# Patient Record
Sex: FEMALE | Race: WHITE | NOT HISPANIC OR LATINO | Employment: FULL TIME | URBAN - NONMETROPOLITAN AREA
[De-identification: names, ages, dates, MRNs, and addresses within clinical notes are randomized per-mention and may not be internally consistent; named-entity substitution may affect disease eponyms.]

---

## 2023-03-27 DIAGNOSIS — K21.9 GERD WITHOUT ESOPHAGITIS: ICD-10-CM

## 2023-03-27 DIAGNOSIS — E55.9 VITAMIN D DEFICIENCY: ICD-10-CM

## 2023-03-27 DIAGNOSIS — F41.9 ANXIETY: Primary | ICD-10-CM

## 2023-03-27 RX ORDER — BUPROPION HYDROCHLORIDE 300 MG/1
1 TABLET ORAL DAILY
COMMUNITY
Start: 2022-05-11 | End: 2023-03-27 | Stop reason: SDUPTHER

## 2023-03-27 RX ORDER — TOPIRAMATE 50 MG/1
50 TABLET, FILM COATED ORAL NIGHTLY
COMMUNITY
Start: 2023-03-17 | End: 2024-03-05 | Stop reason: ALTCHOICE

## 2023-03-27 RX ORDER — ASPIRIN 325 MG
50000 TABLET, DELAYED RELEASE (ENTERIC COATED) ORAL
COMMUNITY
Start: 2022-05-19 | End: 2023-03-27 | Stop reason: SDUPTHER

## 2023-03-27 RX ORDER — OMEPRAZOLE 20 MG/1
1 TABLET, DELAYED RELEASE ORAL DAILY
COMMUNITY
Start: 2023-02-09 | End: 2023-03-27 | Stop reason: SDUPTHER

## 2023-03-27 RX ORDER — ACETAMINOPHEN 500 MG
TABLET ORAL
COMMUNITY
Start: 2021-12-08

## 2023-03-27 RX ORDER — CETIRIZINE HYDROCHLORIDE 10 MG/1
1 TABLET ORAL DAILY
COMMUNITY
Start: 2023-02-09 | End: 2023-06-13 | Stop reason: SDUPTHER

## 2023-03-27 RX ORDER — SIMVASTATIN 40 MG/1
40 TABLET, FILM COATED ORAL NIGHTLY
COMMUNITY
Start: 2023-02-09 | End: 2023-05-22 | Stop reason: SDUPTHER

## 2023-03-28 RX ORDER — BUPROPION HYDROCHLORIDE 300 MG/1
300 TABLET ORAL DAILY
Qty: 30 TABLET | Refills: 0 | Status: SHIPPED | OUTPATIENT
Start: 2023-03-28 | End: 2023-05-02 | Stop reason: SDUPTHER

## 2023-03-28 RX ORDER — ASPIRIN 325 MG
50000 TABLET, DELAYED RELEASE (ENTERIC COATED) ORAL
Qty: 4 CAPSULE | Refills: 0 | Status: SHIPPED | OUTPATIENT
Start: 2023-03-28 | End: 2023-04-03 | Stop reason: SDUPTHER

## 2023-03-28 RX ORDER — OMEPRAZOLE 20 MG/1
20 TABLET, DELAYED RELEASE ORAL DAILY
Qty: 30 TABLET | Refills: 0 | Status: SHIPPED | OUTPATIENT
Start: 2023-03-28 | End: 2023-05-02 | Stop reason: SDUPTHER

## 2023-04-03 ENCOUNTER — TELEMEDICINE (OUTPATIENT)
Dept: PRIMARY CARE | Facility: CLINIC | Age: 63
End: 2023-04-03
Payer: MEDICAID

## 2023-04-03 DIAGNOSIS — I25.10 CORONARY ARTERY DISEASE INVOLVING NATIVE CORONARY ARTERY OF NATIVE HEART WITHOUT ANGINA PECTORIS: ICD-10-CM

## 2023-04-03 DIAGNOSIS — E55.9 VITAMIN D DEFICIENCY: ICD-10-CM

## 2023-04-03 DIAGNOSIS — E66.09 OBESITY DUE TO EXCESS CALORIES WITH SERIOUS COMORBIDITY, UNSPECIFIED CLASSIFICATION: ICD-10-CM

## 2023-04-03 DIAGNOSIS — F32.A DEPRESSION, UNSPECIFIED DEPRESSION TYPE: ICD-10-CM

## 2023-04-03 DIAGNOSIS — U07.1 COVID-19: Primary | ICD-10-CM

## 2023-04-03 PROCEDURE — 99214 OFFICE O/P EST MOD 30 MIN: CPT | Performed by: INTERNAL MEDICINE

## 2023-04-03 RX ORDER — AZITHROMYCIN 250 MG/1
TABLET, FILM COATED ORAL
Qty: 6 TABLET | Refills: 0 | Status: SHIPPED | OUTPATIENT
Start: 2023-04-03 | End: 2023-05-02 | Stop reason: ALTCHOICE

## 2023-04-03 NOTE — PROGRESS NOTES
Subjective   Patient ID: June Borja is a 62 y.o. female who presents for COVID 19  HPI    Tele health visit due to COVID 19    Patient presented today as an acute visit.  She complained of sinus pressure with sore throat and chills with some shortness of breath yesterday x2 days.  She also stated she had a dry cough.  Today she did a COVID test and found she was COVID-positive.      CAD stable on therapy no side effects    COPD stable on therapy no side effects    Depression stable on therapy no side effects    Diet and exercise reviewed    Review of Systems   All other systems reviewed and are negative.      Objective   Physical Exam  Constitutional:       Appearance: Normal appearance. She is obese.      Comments: NAD   Pulmonary:      Effort: Pulmonary effort is normal.   Neurological:      Mental Status: She is alert.   Psychiatric:         Mood and Affect: Mood normal.         Assessment/Plan   Problem List Items Addressed This Visit    None  Visit Diagnoses       COVID-19    -  Primary    Coronary artery disease involving native coronary artery of native heart without angina pectoris        COPD not affecting current episode of care (CMS/[facility])        Depression, unspecified depression type        Obesity due to excess calories with serious comorbidity, unspecified classification            COVID-19  Patient will stay home in isolation  for at least 5 days   Z-Uqincy as directed  started   risk benefits reviewed   rest increase hydration   follow-up as needed /  to ER if symptoms worsen    CAD stable on therapy no side effects    COPD stable on therapy no side effects    Depression stable on therapy no side effects    Diet and exercise reviewed     follow-up 2 weeks.

## 2023-04-04 RX ORDER — ASPIRIN 325 MG
50000 TABLET, DELAYED RELEASE (ENTERIC COATED) ORAL
Qty: 4 CAPSULE | Refills: 0 | Status: SHIPPED | OUTPATIENT
Start: 2023-04-04 | End: 2023-05-04

## 2023-04-28 PROBLEM — I21.9 MYOCARDIAL INFARCTION (MULTI): Status: ACTIVE | Noted: 2023-04-28

## 2023-04-28 PROBLEM — M54.40 LOW BACK PAIN WITH SCIATICA: Status: ACTIVE | Noted: 2023-04-28

## 2023-04-28 PROBLEM — J02.9 SORE THROAT: Status: ACTIVE | Noted: 2023-04-28

## 2023-04-28 PROBLEM — L03.031 PARONYCHIA OF TOENAIL OF RIGHT FOOT: Status: ACTIVE | Noted: 2023-04-28

## 2023-04-28 PROBLEM — E55.9 VITAMIN D DEFICIENCY: Status: ACTIVE | Noted: 2023-04-28

## 2023-04-28 PROBLEM — J44.9 COPD (CHRONIC OBSTRUCTIVE PULMONARY DISEASE) (MULTI): Status: ACTIVE | Noted: 2023-04-28

## 2023-04-28 PROBLEM — I25.10 CORONARY ARTERY DISEASE: Status: ACTIVE | Noted: 2023-04-28

## 2023-04-28 PROBLEM — E66.8 MODERATE OBESITY: Status: ACTIVE | Noted: 2023-04-28

## 2023-04-28 PROBLEM — M54.16 LUMBAR RADICULOPATHY: Status: ACTIVE | Noted: 2023-04-28

## 2023-04-28 PROBLEM — K21.9 GERD WITHOUT ESOPHAGITIS: Status: ACTIVE | Noted: 2023-04-28

## 2023-04-28 PROBLEM — M62.830 SPASM OF LUMBAR PARASPINOUS MUSCLE: Status: ACTIVE | Noted: 2023-04-28

## 2023-04-28 PROBLEM — R73.9 HYPERGLYCEMIA: Status: ACTIVE | Noted: 2023-04-28

## 2023-04-28 PROBLEM — R19.7 DIARRHEA: Status: ACTIVE | Noted: 2023-04-28

## 2023-04-28 PROBLEM — R07.9 CHEST PAIN: Status: ACTIVE | Noted: 2023-04-28

## 2023-04-28 PROBLEM — F32.A DEPRESSION: Status: ACTIVE | Noted: 2023-04-28

## 2023-04-28 PROBLEM — G47.33 OBSTRUCTIVE SLEEP APNEA: Status: ACTIVE | Noted: 2023-04-28

## 2023-04-28 PROBLEM — R53.83 FATIGUE: Status: ACTIVE | Noted: 2023-04-28

## 2023-04-28 PROBLEM — B35.1 ONYCHOMYCOSIS OF TOENAIL: Status: ACTIVE | Noted: 2023-04-28

## 2023-04-28 PROBLEM — N18.31 STAGE 3A CHRONIC KIDNEY DISEASE (MULTI): Status: ACTIVE | Noted: 2023-04-28

## 2023-04-28 PROBLEM — M54.42 ACUTE LEFT-SIDED LOW BACK PAIN WITH LEFT-SIDED SCIATICA: Status: ACTIVE | Noted: 2023-04-28

## 2023-04-28 PROBLEM — J20.8 ACUTE BRONCHITIS DUE TO OTHER SPECIFIED ORGANISMS: Status: ACTIVE | Noted: 2023-04-28

## 2023-04-28 PROBLEM — M25.519 SHOULDER PAIN: Status: ACTIVE | Noted: 2023-04-28

## 2023-04-28 PROBLEM — U07.1 COVID-19 VIRUS INFECTION: Status: ACTIVE | Noted: 2023-04-28

## 2023-04-28 PROBLEM — J30.9 ALLERGIC RHINITIS: Status: ACTIVE | Noted: 2023-04-28

## 2023-04-28 PROBLEM — J32.9 SINUSITIS: Status: ACTIVE | Noted: 2023-04-28

## 2023-04-28 PROBLEM — M47.816 DEGENERATIVE JOINT DISEASE (DJD) OF LUMBAR SPINE: Status: ACTIVE | Noted: 2023-04-28

## 2023-04-28 PROBLEM — S46.919A MUSCLE STRAIN OF SCAPULAR REGION: Status: ACTIVE | Noted: 2023-04-28

## 2023-04-28 PROBLEM — Z20.822 CLOSE EXPOSURE TO COVID-19 VIRUS: Status: ACTIVE | Noted: 2023-04-28

## 2023-04-28 PROBLEM — R50.9 FEVER: Status: ACTIVE | Noted: 2023-04-28

## 2023-04-28 PROBLEM — E66.9 MODERATE OBESITY: Status: ACTIVE | Noted: 2023-04-28

## 2023-04-28 PROBLEM — M54.12 LEFT CERVICAL RADICULOPATHY: Status: ACTIVE | Noted: 2023-04-28

## 2023-04-28 RX ORDER — ASPIRIN 81 MG/1
81 TABLET ORAL
COMMUNITY

## 2023-05-02 ENCOUNTER — OFFICE VISIT (OUTPATIENT)
Dept: PRIMARY CARE | Facility: CLINIC | Age: 63
End: 2023-05-02
Payer: MEDICAID

## 2023-05-02 VITALS
HEART RATE: 83 BPM | OXYGEN SATURATION: 98 % | DIASTOLIC BLOOD PRESSURE: 72 MMHG | WEIGHT: 205.2 LBS | SYSTOLIC BLOOD PRESSURE: 108 MMHG | BODY MASS INDEX: 40.08 KG/M2

## 2023-05-02 DIAGNOSIS — E55.9 VITAMIN D DEFICIENCY: ICD-10-CM

## 2023-05-02 DIAGNOSIS — E78.00 HYPERCHOLESTEREMIA: ICD-10-CM

## 2023-05-02 DIAGNOSIS — F41.9 ANXIETY: ICD-10-CM

## 2023-05-02 DIAGNOSIS — E66.01 CLASS 3 SEVERE OBESITY DUE TO EXCESS CALORIES WITH SERIOUS COMORBIDITY AND BODY MASS INDEX (BMI) OF 40.0 TO 44.9 IN ADULT (MULTI): ICD-10-CM

## 2023-05-02 DIAGNOSIS — N18.31 STAGE 3A CHRONIC KIDNEY DISEASE (MULTI): ICD-10-CM

## 2023-05-02 DIAGNOSIS — I25.10 CORONARY ARTERY DISEASE INVOLVING NATIVE CORONARY ARTERY OF NATIVE HEART WITHOUT ANGINA PECTORIS: Primary | ICD-10-CM

## 2023-05-02 DIAGNOSIS — J44.9 CHRONIC OBSTRUCTIVE PULMONARY DISEASE, UNSPECIFIED COPD TYPE (MULTI): ICD-10-CM

## 2023-05-02 DIAGNOSIS — Z12.31 ENCOUNTER FOR SCREENING MAMMOGRAM FOR MALIGNANT NEOPLASM OF BREAST: ICD-10-CM

## 2023-05-02 DIAGNOSIS — Z12.11 ENCOUNTER FOR SCREENING FOR MALIGNANT NEOPLASM OF COLON: ICD-10-CM

## 2023-05-02 DIAGNOSIS — F32.A DEPRESSION, UNSPECIFIED DEPRESSION TYPE: ICD-10-CM

## 2023-05-02 DIAGNOSIS — K21.9 GERD WITHOUT ESOPHAGITIS: ICD-10-CM

## 2023-05-02 PROCEDURE — 99214 OFFICE O/P EST MOD 30 MIN: CPT | Performed by: INTERNAL MEDICINE

## 2023-05-02 PROCEDURE — 1036F TOBACCO NON-USER: CPT | Performed by: INTERNAL MEDICINE

## 2023-05-02 PROCEDURE — 3008F BODY MASS INDEX DOCD: CPT | Performed by: INTERNAL MEDICINE

## 2023-05-02 RX ORDER — BUPROPION HYDROCHLORIDE 300 MG/1
300 TABLET ORAL DAILY
Qty: 90 TABLET | Refills: 0 | Status: SHIPPED | OUTPATIENT
Start: 2023-05-02 | End: 2023-11-02 | Stop reason: SDUPTHER

## 2023-05-02 RX ORDER — OMEPRAZOLE 20 MG/1
20 TABLET, DELAYED RELEASE ORAL DAILY
Qty: 90 TABLET | Refills: 0 | Status: SHIPPED | OUTPATIENT
Start: 2023-05-02 | End: 2023-06-13 | Stop reason: SDUPTHER

## 2023-05-02 NOTE — PROGRESS NOTES
Subjective   Patient ID: June Borja is a 62 y.o. female who presents for Med Management (3 mth medical management).    HPI    Patient presented for follow-up.    Had COVID over a month ago symptoms resolved.    CAD /hypercholesterolemia on therapy no side effects  We will recheck in a month.  CKD    GERD stable on therapy no side effects    Depression stable on therapy no side effects doing better since she quit her job    COPD stable    CKD / GFR 45 following    Vitamin D deficiency on supplementation.  We will recheck in a month.    Diet and exercise reviewed    Review of Systems   All other systems reviewed and are negative.      Objective   Physical Exam  Vitals reviewed.   Constitutional:       Appearance: Normal appearance. She is obese.   HENT:      Head: Normocephalic and atraumatic.      Mouth/Throat:      Pharynx: No posterior oropharyngeal erythema.   Eyes:      General: No scleral icterus.     Conjunctiva/sclera: Conjunctivae normal.      Pupils: Pupils are equal, round, and reactive to light.   Cardiovascular:      Rate and Rhythm: Normal rate and regular rhythm.      Heart sounds: Normal heart sounds.   Pulmonary:      Effort: No respiratory distress.      Breath sounds: No wheezing.   Abdominal:      General: Abdomen is flat. Bowel sounds are normal. There is no distension.      Palpations: Abdomen is soft. There is no mass.      Tenderness: There is no abdominal tenderness. There is no rebound.   Musculoskeletal:         General: Normal range of motion.      Cervical back: Normal range of motion and neck supple.   Skin:     General: Skin is warm and dry.   Neurological:      General: No focal deficit present.      Mental Status: She is alert and oriented to person, place, and time. Mental status is at baseline.   Psychiatric:         Mood and Affect: Mood normal.         Behavior: Behavior normal.         Thought Content: Thought content normal.         Judgment: Judgment normal.          Assessment/Plan   Problem List Items Addressed This Visit          Respiratory    COPD (chronic obstructive pulmonary disease) (CMS/[facility])       Circulatory    Coronary artery disease - Primary       Digestive    GERD without esophagitis    Relevant Medications    omeprazole OTC (PriLOSEC OTC) 20 mg EC tablet       Genitourinary    Stage 3a chronic kidney disease       Endocrine/Metabolic    Vitamin D deficiency    Relevant Orders    Vitamin D 25 hydroxy       Other    Depression     Other Visit Diagnoses       Hypercholesteremia        Relevant Orders    Comprehensive Metabolic Panel    Lipid Panel    Anxiety        Relevant Medications    buPROPion XL (Wellbutrin XL) 300 mg 24 hr tablet    Encounter for screening mammogram for malignant neoplasm of breast        Relevant Orders    BI mammo bilateral screening tomosynthesis    Encounter for screening for malignant neoplasm of colon        Relevant Orders    Cologuard® colon cancer screening    Class 3 severe obesity due to excess calories with serious comorbidity and body mass index (BMI) of 40.0 to 44.9 in adult (CMS/[facility])            Had COVID over a month ago symptoms resolved.    CAD /hypercholesterolemia on therapy no side effects  We will recheck in a month.      GERD stable on therapy no side effects    Depression / anxiety stable on therapy no side effects doing better since she quit her job    COPD stable    CKD / GFR 45 following    Vitamin D deficiency on supplementation.  We will recheck in a month.    Diet and exercise reviewed    Check cologuard and mammogram    Follow up 6 weeks

## 2023-05-22 DIAGNOSIS — E78.00 HYPERCHOLESTEREMIA: ICD-10-CM

## 2023-05-23 RX ORDER — SIMVASTATIN 40 MG/1
40 TABLET, FILM COATED ORAL NIGHTLY
Qty: 30 TABLET | Refills: 0 | Status: SHIPPED | OUTPATIENT
Start: 2023-05-23 | End: 2023-07-13 | Stop reason: SDUPTHER

## 2023-05-24 DIAGNOSIS — R92.8 ABNORMAL MAMMOGRAM: Primary | ICD-10-CM

## 2023-05-24 NOTE — RESULT ENCOUNTER NOTE
Please call the patient regarding her abnormal result.  Needs further imaging for left breast mass / ordered

## 2023-05-26 LAB — NONINV COLON CA DNA+OCC BLD SCRN STL QL: NEGATIVE

## 2023-06-06 ENCOUNTER — LAB (OUTPATIENT)
Dept: LAB | Facility: LAB | Age: 63
End: 2023-06-06
Payer: MEDICAID

## 2023-06-06 LAB
ALANINE AMINOTRANSFERASE (SGPT) (U/L) IN SER/PLAS: 14 U/L (ref 7–45)
ALBUMIN (G/DL) IN SER/PLAS: 3.9 G/DL (ref 3.4–5)
ALKALINE PHOSPHATASE (U/L) IN SER/PLAS: 77 U/L (ref 33–136)
ANION GAP IN SER/PLAS: 13 MMOL/L (ref 10–20)
ASPARTATE AMINOTRANSFERASE (SGOT) (U/L) IN SER/PLAS: 15 U/L (ref 9–39)
BILIRUBIN TOTAL (MG/DL) IN SER/PLAS: 0.3 MG/DL (ref 0–1.2)
CALCIUM (MG/DL) IN SER/PLAS: 9.6 MG/DL (ref 8.6–10.3)
CARBON DIOXIDE, TOTAL (MMOL/L) IN SER/PLAS: 23 MMOL/L (ref 21–32)
CHLORIDE (MMOL/L) IN SER/PLAS: 108 MMOL/L (ref 98–107)
CHOLESTEROL (MG/DL) IN SER/PLAS: 188 MG/DL (ref 0–199)
CHOLESTEROL IN HDL (MG/DL) IN SER/PLAS: 52.3 MG/DL
CHOLESTEROL/HDL RATIO: 3.6
CREATININE (MG/DL) IN SER/PLAS: 1.3 MG/DL (ref 0.5–1.05)
GFR FEMALE: 47 ML/MIN/1.73M2
GLUCOSE (MG/DL) IN SER/PLAS: 116 MG/DL (ref 74–99)
LDL: 103 MG/DL (ref 0–99)
POTASSIUM (MMOL/L) IN SER/PLAS: 4.4 MMOL/L (ref 3.5–5.3)
PROTEIN TOTAL: 6.8 G/DL (ref 6.4–8.2)
SODIUM (MMOL/L) IN SER/PLAS: 140 MMOL/L (ref 136–145)
TRIGLYCERIDE (MG/DL) IN SER/PLAS: 163 MG/DL (ref 0–149)
UREA NITROGEN (MG/DL) IN SER/PLAS: 28 MG/DL (ref 6–23)
VLDL: 33 MG/DL (ref 0–40)

## 2023-06-06 PROCEDURE — 80053 COMPREHEN METABOLIC PANEL: CPT

## 2023-06-06 PROCEDURE — 36415 COLL VENOUS BLD VENIPUNCTURE: CPT

## 2023-06-06 PROCEDURE — 80061 LIPID PANEL: CPT

## 2023-06-06 PROCEDURE — 82306 VITAMIN D 25 HYDROXY: CPT

## 2023-06-07 LAB — CALCIDIOL (25 OH VITAMIN D3) (NG/ML) IN SER/PLAS: 53 NG/ML

## 2023-06-13 ENCOUNTER — OFFICE VISIT (OUTPATIENT)
Dept: PRIMARY CARE | Facility: CLINIC | Age: 63
End: 2023-06-13
Payer: MEDICAID

## 2023-06-13 VITALS
BODY MASS INDEX: 40.74 KG/M2 | HEART RATE: 75 BPM | DIASTOLIC BLOOD PRESSURE: 80 MMHG | WEIGHT: 208.6 LBS | SYSTOLIC BLOOD PRESSURE: 118 MMHG | OXYGEN SATURATION: 98 %

## 2023-06-13 DIAGNOSIS — I25.10 CORONARY ARTERY DISEASE INVOLVING NATIVE CORONARY ARTERY OF NATIVE HEART WITHOUT ANGINA PECTORIS: ICD-10-CM

## 2023-06-13 DIAGNOSIS — E55.9 VITAMIN D DEFICIENCY: ICD-10-CM

## 2023-06-13 DIAGNOSIS — E78.00 HYPERCHOLESTEROLEMIA: Primary | ICD-10-CM

## 2023-06-13 DIAGNOSIS — J30.9 ALLERGIC RHINITIS, UNSPECIFIED SEASONALITY, UNSPECIFIED TRIGGER: ICD-10-CM

## 2023-06-13 DIAGNOSIS — K21.9 GERD WITHOUT ESOPHAGITIS: ICD-10-CM

## 2023-06-13 DIAGNOSIS — N18.31 STAGE 3A CHRONIC KIDNEY DISEASE (MULTI): ICD-10-CM

## 2023-06-13 DIAGNOSIS — E66.01 CLASS 3 SEVERE OBESITY DUE TO EXCESS CALORIES WITH SERIOUS COMORBIDITY AND BODY MASS INDEX (BMI) OF 40.0 TO 44.9 IN ADULT (MULTI): ICD-10-CM

## 2023-06-13 DIAGNOSIS — F32.A DEPRESSION, UNSPECIFIED DEPRESSION TYPE: ICD-10-CM

## 2023-06-13 DIAGNOSIS — M47.26 OSTEOARTHRITIS OF SPINE WITH RADICULOPATHY, LUMBAR REGION: ICD-10-CM

## 2023-06-13 DIAGNOSIS — R92.8 ABNORMAL MAMMOGRAM OF LEFT BREAST: ICD-10-CM

## 2023-06-13 DIAGNOSIS — J44.9 CHRONIC OBSTRUCTIVE PULMONARY DISEASE, UNSPECIFIED COPD TYPE (MULTI): ICD-10-CM

## 2023-06-13 DIAGNOSIS — M54.16 LUMBAR RADICULOPATHY: ICD-10-CM

## 2023-06-13 DIAGNOSIS — R73.9 HYPERGLYCEMIA: ICD-10-CM

## 2023-06-13 PROCEDURE — 3008F BODY MASS INDEX DOCD: CPT | Performed by: INTERNAL MEDICINE

## 2023-06-13 PROCEDURE — 99214 OFFICE O/P EST MOD 30 MIN: CPT | Performed by: INTERNAL MEDICINE

## 2023-06-13 PROCEDURE — 1036F TOBACCO NON-USER: CPT | Performed by: INTERNAL MEDICINE

## 2023-06-13 RX ORDER — ACETAMINOPHEN 500 MG
5000 TABLET ORAL DAILY
Qty: 90 TABLET | Refills: 0 | Status: SHIPPED | OUTPATIENT
Start: 2023-06-13 | End: 2023-06-21 | Stop reason: SDUPTHER

## 2023-06-13 RX ORDER — CETIRIZINE HYDROCHLORIDE 10 MG/1
10 TABLET ORAL DAILY
Qty: 90 TABLET | Refills: 0 | Status: SHIPPED | OUTPATIENT
Start: 2023-06-13 | End: 2023-11-02 | Stop reason: SDUPTHER

## 2023-06-13 RX ORDER — OMEPRAZOLE 20 MG/1
20 TABLET, DELAYED RELEASE ORAL DAILY
Qty: 90 TABLET | Refills: 0 | Status: SHIPPED | OUTPATIENT
Start: 2023-06-13 | End: 2024-05-22 | Stop reason: SDUPTHER

## 2023-06-13 NOTE — PROGRESS NOTES
Subjective   Patient ID: June Borja is a 62 y.o. female who presents for Shoulder Pain (Left), Hyperlipidemia, and Hyperglycemia.  Shoulder Pain     Hyperlipidemia    Hyperglycemia    follow up labs    Chronic back pain in PT  Needs new pain mgmt (last one left)    Shoulder pain following sleeping on it, intermittent    Allergic rhinitis stable on rx no side effects    CAD /hypercholesterolemia on therapy no side effects     GERD stable on therapy no side effects     Depression / anxiety stable on therapy no side effects doing better since she quit her job     COPD stable     CKD stage 3 a  / GFR 47 following     Vitamin D deficiency on supplementation.  We will recheck in a month.     Diet and exercise reviewed    Review of Systems   All other systems reviewed and are negative.      Objective   /80   Pulse 75   Wt 94.6 kg (208 lb 9.6 oz)   SpO2 98%   BMI 40.74 kg/m²   Lab Results   Component Value Date    WBC 10.2 01/17/2023    HGB 13.9 01/17/2023    HCT 45.2 01/17/2023     01/17/2023    CHOL 188 06/06/2023    TRIG 163 (H) 06/06/2023    HDL 52.3 06/06/2023    ALT 14 06/06/2023    AST 15 06/06/2023     06/06/2023    K 4.4 06/06/2023     (H) 06/06/2023    CREATININE 1.30 (H) 06/06/2023    BUN 28 (H) 06/06/2023    CO2 23 06/06/2023    TSH 2.36 01/17/2023    INR 1.1 05/18/2022    HGBA1C 6.0 (A) 05/18/2022           Physical Exam  Vitals reviewed.   Constitutional:       Appearance: Normal appearance. She is obese.   HENT:      Head: Normocephalic and atraumatic.      Mouth/Throat:      Pharynx: No posterior oropharyngeal erythema.   Eyes:      General: No scleral icterus.     Conjunctiva/sclera: Conjunctivae normal.      Pupils: Pupils are equal, round, and reactive to light.   Cardiovascular:      Rate and Rhythm: Normal rate and regular rhythm.      Heart sounds: Normal heart sounds.   Pulmonary:      Effort: No respiratory distress.      Breath sounds: No wheezing.   Abdominal:       General: Abdomen is flat. Bowel sounds are normal. There is no distension.      Palpations: Abdomen is soft. There is no mass.      Tenderness: There is no abdominal tenderness. There is no rebound.   Musculoskeletal:         General: Normal range of motion.      Cervical back: Normal range of motion and neck supple.   Skin:     General: Skin is warm and dry.   Neurological:      General: No focal deficit present.      Mental Status: She is alert and oriented to person, place, and time. Mental status is at baseline.   Psychiatric:         Mood and Affect: Mood normal.         Behavior: Behavior normal.         Thought Content: Thought content normal.         Judgment: Judgment normal.         Problem List Items Addressed This Visit          Nervous    Lumbar radiculopathy       Respiratory    COPD (chronic obstructive pulmonary disease) (CMS/[facility])       Circulatory    Coronary artery disease       Digestive    GERD without esophagitis    Relevant Medications    omeprazole OTC (PriLOSEC OTC) 20 mg EC tablet       Genitourinary    Stage 3a chronic kidney disease       Musculoskeletal    Degenerative joint disease (DJD) of lumbar spine    Relevant Orders    FL pain management TC       Endocrine/Metabolic    Vitamin D deficiency    Relevant Medications    cholecalciferol (Vitamin D3) 5,000 Units tablet       Other    Allergic rhinitis    Relevant Medications    cetirizine (ZyrTEC) 10 mg tablet    Depression    Hypercholesterolemia - Primary    Hyperglycemia     Other Visit Diagnoses       Abnormal mammogram of left breast        Class 3 severe obesity due to excess calories with serious comorbidity and body mass index (BMI) of 40.0 to 44.9 in adult (CMS/[facility])              Assessment/Plan     follow up labs    Chronic back pain in PT  Needs new pain mgmt (last one left) / ordered    Hyperglycemia on diet    Shoulder pain following sleeping on it, intermittent  Otc rx    Allergic rhinitis stable on rx no side effects    CAD  /hypercholesterolemia stable on therapy no side effects     GERD stable on therapy no side effects     Depression / anxiety stable on therapy no side effects doing better since she quit her job     COPD stable     CKD stage 3 a  / GFR 47 following     Vitamin D deficiency start vit D3 5000 units daily     Diet and exercise reviewed    Abnormal mammogram ultrasound ordered  Cologuard 5-23  GYN on follow up    Follow up 3 months

## 2023-06-21 DIAGNOSIS — E55.9 VITAMIN D DEFICIENCY: ICD-10-CM

## 2023-06-21 RX ORDER — CHOLECALCIFEROL (VITAMIN D3) 50 MCG
5000 TABLET ORAL DAILY
Qty: 90 TABLET | Refills: 0 | Status: SHIPPED | OUTPATIENT
Start: 2023-06-21 | End: 2023-09-19

## 2023-06-29 DIAGNOSIS — N63.20 MASS OF LEFT BREAST, UNSPECIFIED QUADRANT: Primary | ICD-10-CM

## 2023-07-13 DIAGNOSIS — E78.00 HYPERCHOLESTEREMIA: ICD-10-CM

## 2023-07-13 RX ORDER — SIMVASTATIN 40 MG/1
40 TABLET, FILM COATED ORAL NIGHTLY
Qty: 90 TABLET | Refills: 0 | Status: SHIPPED | OUTPATIENT
Start: 2023-07-13 | End: 2023-11-02 | Stop reason: SDUPTHER

## 2023-09-13 ENCOUNTER — APPOINTMENT (OUTPATIENT)
Dept: PRIMARY CARE | Facility: CLINIC | Age: 63
End: 2023-09-13
Payer: MEDICAID

## 2023-11-02 DIAGNOSIS — F41.9 ANXIETY: ICD-10-CM

## 2023-11-02 DIAGNOSIS — E78.00 HYPERCHOLESTEREMIA: ICD-10-CM

## 2023-11-02 DIAGNOSIS — J30.9 ALLERGIC RHINITIS, UNSPECIFIED SEASONALITY, UNSPECIFIED TRIGGER: ICD-10-CM

## 2023-11-05 RX ORDER — BUPROPION HYDROCHLORIDE 300 MG/1
300 TABLET ORAL DAILY
Qty: 15 TABLET | Refills: 0 | Status: SHIPPED | OUTPATIENT
Start: 2023-11-05 | End: 2024-01-02 | Stop reason: SDUPTHER

## 2023-11-05 RX ORDER — CETIRIZINE HYDROCHLORIDE 10 MG/1
10 TABLET ORAL DAILY
Qty: 15 TABLET | Refills: 0 | Status: SHIPPED | OUTPATIENT
Start: 2023-11-05 | End: 2024-01-02 | Stop reason: SDUPTHER

## 2023-11-05 RX ORDER — SIMVASTATIN 40 MG/1
40 TABLET, FILM COATED ORAL NIGHTLY
Qty: 15 TABLET | Refills: 0 | Status: SHIPPED | OUTPATIENT
Start: 2023-11-05 | End: 2024-02-19 | Stop reason: SDUPTHER

## 2024-01-02 DIAGNOSIS — F41.9 ANXIETY: ICD-10-CM

## 2024-01-02 DIAGNOSIS — J30.9 ALLERGIC RHINITIS, UNSPECIFIED SEASONALITY, UNSPECIFIED TRIGGER: ICD-10-CM

## 2024-01-04 RX ORDER — BUPROPION HYDROCHLORIDE 300 MG/1
300 TABLET ORAL DAILY
Qty: 15 TABLET | Refills: 0 | Status: SHIPPED | OUTPATIENT
Start: 2024-01-04 | End: 2024-02-19 | Stop reason: SDUPTHER

## 2024-01-04 RX ORDER — CETIRIZINE HYDROCHLORIDE 10 MG/1
10 TABLET ORAL DAILY
Qty: 15 TABLET | Refills: 0 | Status: SHIPPED | OUTPATIENT
Start: 2024-01-04 | End: 2024-01-25 | Stop reason: SDUPTHER

## 2024-01-05 ENCOUNTER — TELEPHONE (OUTPATIENT)
Dept: PRIMARY CARE | Facility: CLINIC | Age: 64
End: 2024-01-05
Payer: MEDICAID

## 2024-01-05 NOTE — TELEPHONE ENCOUNTER
1/8/24 LMOM to return call       Called to schedule an appt in January per NICOLE    LMOM to schedule appt

## 2024-01-25 DIAGNOSIS — J30.9 ALLERGIC RHINITIS, UNSPECIFIED SEASONALITY, UNSPECIFIED TRIGGER: ICD-10-CM

## 2024-01-28 RX ORDER — CETIRIZINE HYDROCHLORIDE 10 MG/1
10 TABLET ORAL DAILY PRN
Qty: 30 TABLET | Refills: 0 | Status: SHIPPED | OUTPATIENT
Start: 2024-01-28 | End: 2024-04-01 | Stop reason: SDUPTHER

## 2024-02-02 PROBLEM — M47.817 SPONDYLOSIS OF LUMBOSACRAL JOINT WITHOUT MYELOPATHY: Status: ACTIVE | Noted: 2023-06-30

## 2024-02-02 PROBLEM — M47.812 CERVICAL SPONDYLOSIS WITHOUT MYELOPATHY: Status: ACTIVE | Noted: 2023-06-30

## 2024-02-06 ENCOUNTER — APPOINTMENT (OUTPATIENT)
Dept: PRIMARY CARE | Facility: CLINIC | Age: 64
End: 2024-02-06
Payer: MEDICAID

## 2024-02-19 DIAGNOSIS — E78.00 HYPERCHOLESTEREMIA: ICD-10-CM

## 2024-02-19 DIAGNOSIS — F41.9 ANXIETY: ICD-10-CM

## 2024-02-19 RX ORDER — SIMVASTATIN 40 MG/1
40 TABLET, FILM COATED ORAL NIGHTLY
Qty: 30 TABLET | Refills: 0 | Status: SHIPPED | OUTPATIENT
Start: 2024-02-19 | End: 2024-04-01 | Stop reason: SDUPTHER

## 2024-02-19 RX ORDER — BUPROPION HYDROCHLORIDE 300 MG/1
300 TABLET ORAL DAILY
Qty: 30 TABLET | Refills: 0 | Status: SHIPPED | OUTPATIENT
Start: 2024-02-19 | End: 2024-03-05 | Stop reason: SDUPTHER

## 2024-02-21 ENCOUNTER — TELEMEDICINE (OUTPATIENT)
Dept: PRIMARY CARE | Facility: CLINIC | Age: 64
End: 2024-02-21
Payer: MEDICAID

## 2024-02-21 DIAGNOSIS — U07.1 COVID-19: Primary | ICD-10-CM

## 2024-02-21 DIAGNOSIS — J44.9 CHRONIC OBSTRUCTIVE PULMONARY DISEASE, UNSPECIFIED COPD TYPE (MULTI): ICD-10-CM

## 2024-02-21 DIAGNOSIS — I25.10 CORONARY ARTERY DISEASE INVOLVING NATIVE CORONARY ARTERY OF NATIVE HEART WITHOUT ANGINA PECTORIS: ICD-10-CM

## 2024-02-21 DIAGNOSIS — E66.09 OBESITY DUE TO EXCESS CALORIES WITH SERIOUS COMORBIDITY, UNSPECIFIED CLASSIFICATION: ICD-10-CM

## 2024-02-21 DIAGNOSIS — N18.31 STAGE 3A CHRONIC KIDNEY DISEASE (MULTI): ICD-10-CM

## 2024-02-21 PROCEDURE — 99214 OFFICE O/P EST MOD 30 MIN: CPT | Performed by: INTERNAL MEDICINE

## 2024-02-21 PROCEDURE — 1036F TOBACCO NON-USER: CPT | Performed by: INTERNAL MEDICINE

## 2024-02-21 RX ORDER — AZITHROMYCIN 250 MG/1
250 TABLET, FILM COATED ORAL DAILY
Qty: 6 TABLET | Refills: 0 | Status: SHIPPED | OUTPATIENT
Start: 2024-02-21 | End: 2024-03-05 | Stop reason: ALTCHOICE

## 2024-02-21 RX ORDER — METHYLPREDNISOLONE 4 MG/1
TABLET ORAL
Qty: 21 TABLET | Refills: 0 | Status: SHIPPED | OUTPATIENT
Start: 2024-02-21 | End: 2024-03-05 | Stop reason: ALTCHOICE

## 2024-02-21 NOTE — PROGRESS NOTES
Subjective   Patient ID: June Borja is a 63 y.o. female who presents for  sick visit    HPI    Tele visit    Last seen 6-23    Same day sick    Patient became ill on 2/18/2024.  She complained of a sore throat with earache as well as a cough,  fever,  and some mild dyspnea.  She denied chest pain.  Initial COVID was negative.  COVID testing repeated yesterday was positive.  COVID-19  Medrol Dosepak as directed with food  Z-Quincy as directed  Risk /  benefits reviewed  Rest increase hydration  Quarantine until 2/23/2024  Continue mask until cough has resolved  To ER if any worsening in symptoms    Chronic back pain in PT  Needs new pain mgmt (last one left) / ordered not done     Hyperglycemia on diet     Shoulder pain following sleeping on it, intermittent  Otc rx     Allergic rhinitis stable on rx no side effects     CAD / hypercholesterolemia stable on therapy no side effects     GERD stable on therapy no side effects     Depression / anxiety stable on therapy no side effects doing better since she quit her job     COPD stable     CKD stage 3 a  / GFR 47 following     Vitamin D deficiency start vit D3 5000 units daily     Diet and exercise reviewed     Abnormal mammogram ultrasound ordered / not done  Cologuard 5-23    GYN on follow up      Review of Systems   All other systems reviewed and are negative.      Objective   There were no vitals taken for this visit.  Lab Results   Component Value Date    WBC 10.2 01/17/2023    HGB 13.9 01/17/2023    HCT 45.2 01/17/2023     01/17/2023    CHOL 188 06/06/2023    TRIG 163 (H) 06/06/2023    HDL 52.3 06/06/2023    ALT 14 06/06/2023    AST 15 06/06/2023     06/06/2023    K 4.4 06/06/2023     (H) 06/06/2023    CREATININE 1.30 (H) 06/06/2023    BUN 28 (H) 06/06/2023    CO2 23 06/06/2023    TSH 2.36 01/17/2023    INR 1.1 05/18/2022    HGBA1C 6.0 (A) 05/18/2022           Physical Exam  Constitutional:       Appearance: Normal appearance. She is obese.    Pulmonary:      Effort: Pulmonary effort is normal.   Neurological:      Mental Status: She is alert.   Psychiatric:         Mood and Affect: Mood normal.         Problem List Items Addressed This Visit             ICD-10-CM    COPD (chronic obstructive pulmonary disease) (CMS/[facility]) J44.9    Coronary artery disease I25.10    Stage 3a chronic kidney disease (CMS/[facility]) N18.31     Other Visit Diagnoses         Codes    COVID-19    -  Primary U07.1    Relevant Medications    azithromycin (Zithromax) 250 mg tablet    methylPREDNISolone (Medrol Dospak) 4 mg tablets    Obesity due to excess calories with serious comorbidity, unspecified classification     E66.09          Assessment/Plan     Tele visit    Last seen 6-23    Same day sick    Patient became ill on 2/18/2024.  She complained of a sore throat with earache as well as a cough,  fever,  and some mild dyspnea.  She denied chest pain.  Initial COVID was negative.  COVID testing repeated yesterday was positive.  COVID-19  Medrol Dosepak as directed with food  Z-Quincy as directed  Risk /  benefits reviewed  Rest increase hydration  Quarantine until 2/23/2024  Continue mask until cough has resolved  To ER if any worsening in symptoms    Chronic back pain in PT  Needs new pain mgmt (last one left) / ordered not done     Hyperglycemia on diet     Shoulder pain following sleeping on it, intermittent  Otc rx     Allergic rhinitis stable on rx no side effects     CAD / hypercholesterolemia stable on therapy no side effects     GERD stable on therapy no side effects     Depression / anxiety stable on therapy no side effects doing better since she quit her job     COPD stable     CKD stage 3 a  / GFR 47 following     Vitamin D deficiency start vit D3 5000 units daily     Diet and exercise reviewed     Abnormal mammogram ultrasound ordered / not done  Cologuaisaura 5-23    GYN on follow up    Follow up 3 weeks

## 2024-03-05 ENCOUNTER — OFFICE VISIT (OUTPATIENT)
Dept: PRIMARY CARE | Facility: CLINIC | Age: 64
End: 2024-03-05
Payer: MEDICAID

## 2024-03-05 VITALS
BODY MASS INDEX: 44.45 KG/M2 | WEIGHT: 227.6 LBS | DIASTOLIC BLOOD PRESSURE: 84 MMHG | OXYGEN SATURATION: 99 % | HEART RATE: 109 BPM | SYSTOLIC BLOOD PRESSURE: 122 MMHG

## 2024-03-05 DIAGNOSIS — E78.00 HYPERCHOLESTEROLEMIA: ICD-10-CM

## 2024-03-05 DIAGNOSIS — J30.9 ALLERGIC RHINITIS, UNSPECIFIED SEASONALITY, UNSPECIFIED TRIGGER: ICD-10-CM

## 2024-03-05 DIAGNOSIS — K21.9 GERD WITHOUT ESOPHAGITIS: ICD-10-CM

## 2024-03-05 DIAGNOSIS — U07.1 COVID-19: Primary | ICD-10-CM

## 2024-03-05 DIAGNOSIS — F41.9 ANXIETY: ICD-10-CM

## 2024-03-05 DIAGNOSIS — N18.31 STAGE 3A CHRONIC KIDNEY DISEASE (MULTI): ICD-10-CM

## 2024-03-05 DIAGNOSIS — E66.09 OBESITY DUE TO EXCESS CALORIES WITH SERIOUS COMORBIDITY, UNSPECIFIED CLASSIFICATION: ICD-10-CM

## 2024-03-05 DIAGNOSIS — R73.9 HYPERGLYCEMIA: ICD-10-CM

## 2024-03-05 DIAGNOSIS — I25.10 CORONARY ARTERY DISEASE INVOLVING NATIVE CORONARY ARTERY OF NATIVE HEART WITHOUT ANGINA PECTORIS: ICD-10-CM

## 2024-03-05 DIAGNOSIS — J44.9 CHRONIC OBSTRUCTIVE PULMONARY DISEASE, UNSPECIFIED COPD TYPE (MULTI): ICD-10-CM

## 2024-03-05 PROCEDURE — 99214 OFFICE O/P EST MOD 30 MIN: CPT | Performed by: INTERNAL MEDICINE

## 2024-03-05 PROCEDURE — 1036F TOBACCO NON-USER: CPT | Performed by: INTERNAL MEDICINE

## 2024-03-05 RX ORDER — BUPROPION HYDROCHLORIDE 300 MG/1
300 TABLET ORAL DAILY
Qty: 90 TABLET | Refills: 0 | Status: SHIPPED | OUTPATIENT
Start: 2024-03-05 | End: 2024-06-03

## 2024-03-05 RX ORDER — ASPIRIN 325 MG
50000 TABLET, DELAYED RELEASE (ENTERIC COATED) ORAL
COMMUNITY
End: 2024-04-01 | Stop reason: SDUPTHER

## 2024-03-05 NOTE — PROGRESS NOTES
Subjective   Patient ID: June Borja is a 63 y.o. female who presents for Follow-up (3 week).  HPI    follow up COVID  Feeling better    Chronic back pain in PT  In pain mgmt     Hyperglycemia on diet     Shoulder pain following sleeping on it, intermittent  Otc rx     Allergic rhinitis stable on rx no side effects     CAD / hypercholesterolemia stable on therapy no side effects     GERD stable on therapy no side effects     Depression / anxiety stable on therapy no side effects doing better since she quit her job     COPD stable     CKD stage 3 a  / GFR 47 following     Vitamin D deficiency start vit D3 5000 units daily     Diet and exercise reviewed     Abnormal mammogram ultrasound ordered / not done  Cologuard 5-23     GYN on follow up     Review of Systems   All other systems reviewed and are negative.      Objective   /84   Pulse 109   Wt 103 kg (227 lb 9.6 oz)   SpO2 99%   BMI 44.45 kg/m²   Lab Results   Component Value Date    WBC 10.2 01/17/2023    HGB 13.9 01/17/2023    HCT 45.2 01/17/2023     01/17/2023    CHOL 188 06/06/2023    TRIG 163 (H) 06/06/2023    HDL 52.3 06/06/2023    ALT 14 06/06/2023    AST 15 06/06/2023     06/06/2023    K 4.4 06/06/2023     (H) 06/06/2023    CREATININE 1.30 (H) 06/06/2023    BUN 28 (H) 06/06/2023    CO2 23 06/06/2023    TSH 2.36 01/17/2023    INR 1.1 05/18/2022    HGBA1C 6.0 (A) 05/18/2022           Physical Exam  Vitals reviewed.   Constitutional:       Appearance: Normal appearance. She is obese.   HENT:      Head: Normocephalic and atraumatic.      Mouth/Throat:      Pharynx: No posterior oropharyngeal erythema.   Eyes:      General: No scleral icterus.     Conjunctiva/sclera: Conjunctivae normal.      Pupils: Pupils are equal, round, and reactive to light.   Cardiovascular:      Rate and Rhythm: Normal rate and regular rhythm.      Heart sounds: Normal heart sounds.   Pulmonary:      Effort: No respiratory distress.      Breath sounds: No  wheezing.   Abdominal:      General: Abdomen is flat. Bowel sounds are normal. There is no distension.      Palpations: Abdomen is soft. There is no mass.      Tenderness: There is no abdominal tenderness. There is no rebound.   Musculoskeletal:         General: Normal range of motion.      Cervical back: Normal range of motion and neck supple.   Skin:     General: Skin is warm and dry.   Neurological:      General: No focal deficit present.      Mental Status: She is alert and oriented to person, place, and time. Mental status is at baseline.   Psychiatric:         Mood and Affect: Mood normal.         Behavior: Behavior normal.         Thought Content: Thought content normal.         Judgment: Judgment normal.         Problem List Items Addressed This Visit             ICD-10-CM    Allergic rhinitis J30.9    COPD (chronic obstructive pulmonary disease) (CMS/[facility]) J44.9    Coronary artery disease I25.10    GERD without esophagitis K21.9    Hypercholesterolemia E78.00    Hyperglycemia R73.9    Stage 3a chronic kidney disease (CMS/[facility]) N18.31     Other Visit Diagnoses         Codes    COVID-19    -  Primary U07.1    Anxiety     F41.9    Relevant Medications    buPROPion XL (Wellbutrin XL) 300 mg 24 hr tablet    Obesity due to excess calories with serious comorbidity, unspecified classification     E66.09          Assessment/Plan     follow up COVID  Feeling better    Chronic back pain in PT  In pain mgmt     Hyperglycemia on diet     Shoulder pain following sleeping on it, intermittent  Otc rx     Allergic rhinitis stable on rx no side effects     CAD / hypercholesterolemia stable on therapy no side effects     GERD stable on therapy no side effects     Depression / anxiety stable on therapy no side effects doing better since she quit her job     COPD stable     CKD stage 3 a  / GFR 47 following     Vitamin D deficiency on vit D3 5000 units daily     Diet and exercise reviewed     Abnormal mammogram ultrasound ordered  / not done  Cologuard 5-23 negative     GYN on follow up     Follow up 3 months / yearly physical

## 2024-04-01 DIAGNOSIS — E78.00 HYPERCHOLESTEREMIA: ICD-10-CM

## 2024-04-01 DIAGNOSIS — J30.9 ALLERGIC RHINITIS, UNSPECIFIED SEASONALITY, UNSPECIFIED TRIGGER: ICD-10-CM

## 2024-04-01 RX ORDER — SIMVASTATIN 40 MG/1
40 TABLET, FILM COATED ORAL NIGHTLY
Qty: 90 TABLET | Refills: 0 | Status: SHIPPED | OUTPATIENT
Start: 2024-04-01 | End: 2024-06-30

## 2024-04-01 RX ORDER — CETIRIZINE HYDROCHLORIDE 10 MG/1
10 TABLET ORAL DAILY PRN
Qty: 90 TABLET | Refills: 0 | Status: SHIPPED | OUTPATIENT
Start: 2024-04-01 | End: 2024-06-30

## 2024-04-01 RX ORDER — ASPIRIN 325 MG
50000 TABLET, DELAYED RELEASE (ENTERIC COATED) ORAL
Qty: 12 CAPSULE | Refills: 0 | Status: SHIPPED | OUTPATIENT
Start: 2024-04-01

## 2024-04-30 DIAGNOSIS — J30.9 ALLERGIC RHINITIS, UNSPECIFIED SEASONALITY, UNSPECIFIED TRIGGER: ICD-10-CM

## 2024-04-30 RX ORDER — ASPIRIN 325 MG
50000 TABLET, DELAYED RELEASE (ENTERIC COATED) ORAL
Qty: 12 CAPSULE | Refills: 0 | OUTPATIENT
Start: 2024-04-30

## 2024-05-22 DIAGNOSIS — K21.9 GERD WITHOUT ESOPHAGITIS: ICD-10-CM

## 2024-05-22 DIAGNOSIS — J30.9 ALLERGIC RHINITIS, UNSPECIFIED SEASONALITY, UNSPECIFIED TRIGGER: ICD-10-CM

## 2024-05-22 RX ORDER — ASPIRIN 325 MG
50000 TABLET, DELAYED RELEASE (ENTERIC COATED) ORAL
Qty: 12 CAPSULE | Refills: 0 | OUTPATIENT
Start: 2024-05-26

## 2024-05-22 RX ORDER — OMEPRAZOLE 20 MG/1
20 TABLET, DELAYED RELEASE ORAL DAILY
Qty: 90 TABLET | Refills: 0 | Status: SHIPPED | OUTPATIENT
Start: 2024-05-22 | End: 2024-08-20

## 2024-06-13 ENCOUNTER — APPOINTMENT (OUTPATIENT)
Dept: PRIMARY CARE | Facility: CLINIC | Age: 64
End: 2024-06-13
Payer: MEDICAID

## 2024-07-15 DIAGNOSIS — J30.9 ALLERGIC RHINITIS, UNSPECIFIED SEASONALITY, UNSPECIFIED TRIGGER: ICD-10-CM

## 2024-07-15 DIAGNOSIS — E78.00 HYPERCHOLESTEREMIA: ICD-10-CM

## 2024-07-15 DIAGNOSIS — F41.9 ANXIETY: ICD-10-CM

## 2024-07-15 RX ORDER — BUPROPION HYDROCHLORIDE 300 MG/1
300 TABLET ORAL DAILY
Qty: 30 TABLET | Refills: 0 | Status: SHIPPED | OUTPATIENT
Start: 2024-07-15 | End: 2024-10-13

## 2024-07-15 RX ORDER — ASPIRIN 325 MG
50000 TABLET, DELAYED RELEASE (ENTERIC COATED) ORAL
Qty: 12 CAPSULE | Refills: 0 | OUTPATIENT
Start: 2024-07-15

## 2024-07-15 RX ORDER — CETIRIZINE HYDROCHLORIDE 10 MG/1
10 TABLET ORAL DAILY PRN
Qty: 30 TABLET | Refills: 0 | Status: SHIPPED | OUTPATIENT
Start: 2024-07-15 | End: 2024-10-13

## 2024-07-15 RX ORDER — SIMVASTATIN 40 MG/1
40 TABLET, FILM COATED ORAL NIGHTLY
Qty: 30 TABLET | Refills: 0 | Status: SHIPPED | OUTPATIENT
Start: 2024-07-15 | End: 2024-10-13

## 2024-08-06 ENCOUNTER — APPOINTMENT (OUTPATIENT)
Dept: PRIMARY CARE | Facility: CLINIC | Age: 64
End: 2024-08-06
Payer: MEDICAID

## 2024-08-06 VITALS
TEMPERATURE: 98 F | OXYGEN SATURATION: 98 % | DIASTOLIC BLOOD PRESSURE: 64 MMHG | HEART RATE: 104 BPM | BODY MASS INDEX: 45.39 KG/M2 | SYSTOLIC BLOOD PRESSURE: 112 MMHG | WEIGHT: 232.4 LBS

## 2024-08-06 DIAGNOSIS — J44.9 CHRONIC OBSTRUCTIVE PULMONARY DISEASE, UNSPECIFIED COPD TYPE (MULTI): ICD-10-CM

## 2024-08-06 DIAGNOSIS — E66.01 CLASS 3 SEVERE OBESITY DUE TO EXCESS CALORIES WITH SERIOUS COMORBIDITY AND BODY MASS INDEX (BMI) OF 45.0 TO 49.9 IN ADULT (MULTI): ICD-10-CM

## 2024-08-06 DIAGNOSIS — M81.0 POSTMENOPAUSAL OSTEOPOROSIS: ICD-10-CM

## 2024-08-06 DIAGNOSIS — K21.9 GERD WITHOUT ESOPHAGITIS: ICD-10-CM

## 2024-08-06 DIAGNOSIS — E78.00 HYPERCHOLESTEROLEMIA: ICD-10-CM

## 2024-08-06 DIAGNOSIS — I25.10 CORONARY ARTERY DISEASE INVOLVING NATIVE CORONARY ARTERY OF NATIVE HEART WITHOUT ANGINA PECTORIS: ICD-10-CM

## 2024-08-06 DIAGNOSIS — Z87.891 SMOKING HX: ICD-10-CM

## 2024-08-06 DIAGNOSIS — R73.9 HYPERGLYCEMIA: ICD-10-CM

## 2024-08-06 DIAGNOSIS — M54.9 CHRONIC BACK PAIN, UNSPECIFIED BACK LOCATION, UNSPECIFIED BACK PAIN LATERALITY: ICD-10-CM

## 2024-08-06 DIAGNOSIS — Z12.31 ENCOUNTER FOR SCREENING MAMMOGRAM FOR MALIGNANT NEOPLASM OF BREAST: ICD-10-CM

## 2024-08-06 DIAGNOSIS — G89.29 CHRONIC BACK PAIN, UNSPECIFIED BACK LOCATION, UNSPECIFIED BACK PAIN LATERALITY: ICD-10-CM

## 2024-08-06 DIAGNOSIS — Z00.00 ANNUAL PHYSICAL EXAM: Primary | ICD-10-CM

## 2024-08-06 DIAGNOSIS — F32.A DEPRESSION, UNSPECIFIED DEPRESSION TYPE: ICD-10-CM

## 2024-08-06 DIAGNOSIS — E55.9 VITAMIN D DEFICIENCY: ICD-10-CM

## 2024-08-06 PROCEDURE — 99214 OFFICE O/P EST MOD 30 MIN: CPT | Performed by: INTERNAL MEDICINE

## 2024-08-06 PROCEDURE — 1036F TOBACCO NON-USER: CPT | Performed by: INTERNAL MEDICINE

## 2024-08-06 PROCEDURE — 99396 PREV VISIT EST AGE 40-64: CPT | Performed by: INTERNAL MEDICINE

## 2024-08-06 ASSESSMENT — PATIENT HEALTH QUESTIONNAIRE - PHQ9
SUM OF ALL RESPONSES TO PHQ9 QUESTIONS 1 AND 2: 4
1. LITTLE INTEREST OR PLEASURE IN DOING THINGS: NEARLY EVERY DAY
2. FEELING DOWN, DEPRESSED OR HOPELESS: SEVERAL DAYS

## 2024-08-06 ASSESSMENT — ANXIETY QUESTIONNAIRES
1. FEELING NERVOUS, ANXIOUS, OR ON EDGE: SEVERAL DAYS
6. BECOMING EASILY ANNOYED OR IRRITABLE: SEVERAL DAYS
IF YOU CHECKED OFF ANY PROBLEMS ON THIS QUESTIONNAIRE, HOW DIFFICULT HAVE THESE PROBLEMS MADE IT FOR YOU TO DO YOUR WORK, TAKE CARE OF THINGS AT HOME, OR GET ALONG WITH OTHER PEOPLE: SOMEWHAT DIFFICULT
2. NOT BEING ABLE TO STOP OR CONTROL WORRYING: SEVERAL DAYS
3. WORRYING TOO MUCH ABOUT DIFFERENT THINGS: SEVERAL DAYS
GAD7 TOTAL SCORE: 6
4. TROUBLE RELAXING: SEVERAL DAYS
5. BEING SO RESTLESS THAT IT IS HARD TO SIT STILL: SEVERAL DAYS
7. FEELING AFRAID AS IF SOMETHING AWFUL MIGHT HAPPEN: NOT AT ALL

## 2024-08-06 NOTE — PROGRESS NOTES
Subjective   Patient ID: June Borja is a 63 y.o. female who presents for Annual Exam / follow up  HPI    Yearly physical    Mammogram 5-23  Dexa none  Cologuard 5-23 neg  CT chest lung cancer screening none  GYN due  immunizations rev'd RSV, shingrix  BMI 45.3    Follow up    Chronic back pain      Hyperglycemia on diet     Shoulder pain following sleeping on it, intermittent  Otc rx     Allergic rhinitis stable on rx no side effects     CAD / hypercholesterolemia stable on therapy no side effects     GERD stable on therapy no side effects     Depression / anxiety stable on therapy no side effects doing better since she quit her job     COPD stable     CKD stage 3 a  / GFR 47 following     Vitamin D deficiency on vit D3 5000 units daily     Diet and exercise reviewed     GYN on follow up    Review of Systems   All other systems reviewed and are negative.      Objective   /64   Pulse 104   Temp 36.7 °C (98 °F)   Wt 105 kg (232 lb 6.4 oz)   SpO2 98%   BMI 45.39 kg/m²   Lab Results   Component Value Date    WBC 10.2 01/17/2023    HGB 13.9 01/17/2023    HCT 45.2 01/17/2023     01/17/2023    CHOL 188 06/06/2023    TRIG 163 (H) 06/06/2023    HDL 52.3 06/06/2023    ALT 14 06/06/2023    AST 15 06/06/2023     06/06/2023    K 4.4 06/06/2023     (H) 06/06/2023    CREATININE 1.30 (H) 06/06/2023    BUN 28 (H) 06/06/2023    CO2 23 06/06/2023    TSH 2.36 01/17/2023    INR 1.1 05/18/2022    HGBA1C 6.0 (A) 05/18/2022           Physical Exam  Vitals reviewed.   Constitutional:       Appearance: Normal appearance. She is obese.   HENT:      Head: Normocephalic and atraumatic.      Mouth/Throat:      Pharynx: No posterior oropharyngeal erythema.   Eyes:      General: No scleral icterus.     Conjunctiva/sclera: Conjunctivae normal.      Pupils: Pupils are equal, round, and reactive to light.   Cardiovascular:      Rate and Rhythm: Normal rate and regular rhythm.      Heart sounds: Normal heart sounds.    Pulmonary:      Effort: No respiratory distress.      Breath sounds: No wheezing.   Abdominal:      General: Abdomen is flat. Bowel sounds are normal. There is no distension.      Palpations: Abdomen is soft. There is no mass.      Tenderness: There is no abdominal tenderness. There is no rebound.   Musculoskeletal:         General: Normal range of motion.      Cervical back: Normal range of motion and neck supple.   Skin:     General: Skin is warm and dry.   Neurological:      General: No focal deficit present.      Mental Status: She is alert and oriented to person, place, and time. Mental status is at baseline.   Psychiatric:         Mood and Affect: Mood normal.         Behavior: Behavior normal.         Thought Content: Thought content normal.         Judgment: Judgment normal.         Problem List Items Addressed This Visit             ICD-10-CM    COPD (chronic obstructive pulmonary disease) (Multi) J44.9    Coronary artery disease I25.10    Depression F32.A    GERD without esophagitis K21.9    Hypercholesterolemia E78.00    Hyperglycemia R73.9    Relevant Orders    Hemoglobin A1C    Vitamin D deficiency E55.9    Relevant Orders    Vitamin D 25-Hydroxy,Total (for eval of Vitamin D levels)     Other Visit Diagnoses         Codes    Annual physical exam    -  Primary Z00.00    Relevant Orders    CBC and Auto Differential    Comprehensive Metabolic Panel    Lipid Panel    TSH with reflex to Free T4 if abnormal    Smoking hx     Z87.891    Relevant Orders    CT lung screening low dose    Chronic back pain, unspecified back location, unspecified back pain laterality     M54.9, G89.29    Postmenopausal osteoporosis     M81.0    Relevant Orders    XR DEXA bone density    Encounter for screening mammogram for malignant neoplasm of breast     Z12.31    Relevant Orders    BI mammo bilateral screening tomosynthesis    Class 3 severe obesity due to excess calories with serious comorbidity and body mass index (BMI) of  45.0 to 49.9 in adult (Multi)     E66.01, Z68.42          Assessment/Plan     Yearly physical    Mammogram 5-23  Dexa none  Cologuard 5-23 neg  CT chest lung cancer screening none  GYN due  immunizations rev'd RSV, shingrix  BMI 45.3    Follow up    Chronic back pain      Hyperglycemia on diet     Shoulder pain following sleeping on it, intermittent  Otc rx     Allergic rhinitis stable on rx no side effects     CAD / hypercholesterolemia stable on therapy no side effects     GERD stable on therapy no side effects     Depression / anxiety stable on therapy no side effects doing better since she quit her job     COPD stable     CKD stage 3 a  / GFR 47 following     Vitamin D deficiency on vit D3 5000 units daily     Diet and exercise reviewed     GYN on follow up    Check labs, mammogram, dexa, CT lung cancer screening (30 pack yrs quit 2016)    Follow up pending

## 2024-08-12 ENCOUNTER — LAB (OUTPATIENT)
Dept: LAB | Facility: LAB | Age: 64
End: 2024-08-12
Payer: MEDICAID

## 2024-08-12 DIAGNOSIS — Z00.00 ANNUAL PHYSICAL EXAM: ICD-10-CM

## 2024-08-12 DIAGNOSIS — E55.9 VITAMIN D DEFICIENCY: ICD-10-CM

## 2024-08-12 DIAGNOSIS — R73.9 HYPERGLYCEMIA: ICD-10-CM

## 2024-08-12 LAB
ALBUMIN SERPL BCP-MCNC: 3.8 G/DL (ref 3.4–5)
ALP SERPL-CCNC: 79 U/L (ref 33–136)
ALT SERPL W P-5'-P-CCNC: 30 U/L (ref 7–45)
ANION GAP SERPL CALC-SCNC: 11 MMOL/L (ref 10–20)
AST SERPL W P-5'-P-CCNC: 21 U/L (ref 9–39)
BASOPHILS # BLD AUTO: 0.07 X10*3/UL (ref 0–0.1)
BASOPHILS NFR BLD AUTO: 1 %
BILIRUB SERPL-MCNC: 0.3 MG/DL (ref 0–1.2)
BUN SERPL-MCNC: 16 MG/DL (ref 6–23)
CALCIUM SERPL-MCNC: 9.1 MG/DL (ref 8.6–10.3)
CHLORIDE SERPL-SCNC: 112 MMOL/L (ref 98–107)
CHOLEST SERPL-MCNC: 145 MG/DL (ref 0–199)
CHOLESTEROL/HDL RATIO: 3.3
CO2 SERPL-SCNC: 24 MMOL/L (ref 21–32)
CREAT SERPL-MCNC: 1.35 MG/DL (ref 0.5–1.05)
EGFRCR SERPLBLD CKD-EPI 2021: 45 ML/MIN/1.73M*2
EOSINOPHIL # BLD AUTO: 0.22 X10*3/UL (ref 0–0.7)
EOSINOPHIL NFR BLD AUTO: 3.2 %
ERYTHROCYTE [DISTWIDTH] IN BLOOD BY AUTOMATED COUNT: 14.4 % (ref 11.5–14.5)
GLUCOSE SERPL-MCNC: 131 MG/DL (ref 74–99)
HCT VFR BLD AUTO: 39.2 % (ref 36–46)
HDLC SERPL-MCNC: 43.7 MG/DL
HGB BLD-MCNC: 12.2 G/DL (ref 12–16)
IMM GRANULOCYTES # BLD AUTO: 0.03 X10*3/UL (ref 0–0.7)
IMM GRANULOCYTES NFR BLD AUTO: 0.4 % (ref 0–0.9)
LDLC SERPL CALC-MCNC: 79 MG/DL
LYMPHOCYTES # BLD AUTO: 1.17 X10*3/UL (ref 1.2–4.8)
LYMPHOCYTES NFR BLD AUTO: 17.2 %
MCH RBC QN AUTO: 27.4 PG (ref 26–34)
MCHC RBC AUTO-ENTMCNC: 31.1 G/DL (ref 32–36)
MCV RBC AUTO: 88 FL (ref 80–100)
MONOCYTES # BLD AUTO: 0.59 X10*3/UL (ref 0.1–1)
MONOCYTES NFR BLD AUTO: 8.7 %
NEUTROPHILS # BLD AUTO: 4.72 X10*3/UL (ref 1.2–7.7)
NEUTROPHILS NFR BLD AUTO: 69.5 %
NON HDL CHOLESTEROL: 101 MG/DL (ref 0–149)
NRBC BLD-RTO: 0 /100 WBCS (ref 0–0)
PLATELET # BLD AUTO: 293 X10*3/UL (ref 150–450)
POTASSIUM SERPL-SCNC: 4.2 MMOL/L (ref 3.5–5.3)
PROT SERPL-MCNC: 6.6 G/DL (ref 6.4–8.2)
RBC # BLD AUTO: 4.45 X10*6/UL (ref 4–5.2)
SODIUM SERPL-SCNC: 143 MMOL/L (ref 136–145)
TRIGL SERPL-MCNC: 112 MG/DL (ref 0–149)
TSH SERPL-ACNC: 3.1 MIU/L (ref 0.44–3.98)
VLDL: 22 MG/DL (ref 0–40)
WBC # BLD AUTO: 6.8 X10*3/UL (ref 4.4–11.3)

## 2024-08-12 PROCEDURE — 83036 HEMOGLOBIN GLYCOSYLATED A1C: CPT

## 2024-08-12 PROCEDURE — 82306 VITAMIN D 25 HYDROXY: CPT

## 2024-08-12 PROCEDURE — 36415 COLL VENOUS BLD VENIPUNCTURE: CPT

## 2024-08-13 LAB
25(OH)D3 SERPL-MCNC: 38 NG/ML (ref 30–100)
EST. AVERAGE GLUCOSE BLD GHB EST-MCNC: 131 MG/DL
HBA1C MFR BLD: 6.2 %

## 2024-08-15 ENCOUNTER — HOSPITAL ENCOUNTER (OUTPATIENT)
Dept: RADIOLOGY | Facility: HOSPITAL | Age: 64
Discharge: HOME | End: 2024-08-15
Payer: MEDICAID

## 2024-08-15 DIAGNOSIS — M81.0 POSTMENOPAUSAL OSTEOPOROSIS: ICD-10-CM

## 2024-08-15 PROCEDURE — 77080 DXA BONE DENSITY AXIAL: CPT

## 2024-08-15 PROCEDURE — 77080 DXA BONE DENSITY AXIAL: CPT | Performed by: RADIOLOGY

## 2024-08-15 ASSESSMENT — LIFESTYLE VARIABLES
3_OR_MORE_DRINKS_PER_DAY: N
CURRENT_SMOKER: N

## 2024-09-05 ENCOUNTER — HOSPITAL ENCOUNTER (OUTPATIENT)
Dept: RADIOLOGY | Facility: HOSPITAL | Age: 64
Discharge: HOME | End: 2024-09-05
Payer: MEDICAID

## 2024-09-05 ENCOUNTER — APPOINTMENT (OUTPATIENT)
Dept: RADIOLOGY | Facility: CLINIC | Age: 64
End: 2024-09-05
Payer: MEDICAID

## 2024-09-05 VITALS — BODY MASS INDEX: 46.92 KG/M2 | HEIGHT: 60 IN | WEIGHT: 239 LBS

## 2024-09-05 DIAGNOSIS — Z87.891 SMOKING HX: ICD-10-CM

## 2024-09-05 DIAGNOSIS — Z12.31 ENCOUNTER FOR SCREENING MAMMOGRAM FOR MALIGNANT NEOPLASM OF BREAST: ICD-10-CM

## 2024-09-05 PROCEDURE — 71271 CT THORAX LUNG CANCER SCR C-: CPT

## 2024-09-05 PROCEDURE — 77067 SCR MAMMO BI INCL CAD: CPT

## 2024-09-05 PROCEDURE — 77067 SCR MAMMO BI INCL CAD: CPT | Performed by: RADIOLOGY

## 2024-09-05 PROCEDURE — 77063 BREAST TOMOSYNTHESIS BI: CPT | Performed by: RADIOLOGY

## 2024-09-09 NOTE — RESULT ENCOUNTER NOTE
Please call the patient regarding her abnormal result.  Needs to call radiology for follow up imaging

## 2024-09-25 ENCOUNTER — APPOINTMENT (OUTPATIENT)
Dept: PRIMARY CARE | Facility: CLINIC | Age: 64
End: 2024-09-25
Payer: MEDICAID

## 2024-09-25 VITALS
BODY MASS INDEX: 46.17 KG/M2 | TEMPERATURE: 97.4 F | WEIGHT: 236.4 LBS | HEART RATE: 85 BPM | DIASTOLIC BLOOD PRESSURE: 84 MMHG | SYSTOLIC BLOOD PRESSURE: 128 MMHG | OXYGEN SATURATION: 99 %

## 2024-09-25 DIAGNOSIS — F41.9 ANXIETY: ICD-10-CM

## 2024-09-25 DIAGNOSIS — M54.9 CHRONIC BACK PAIN, UNSPECIFIED BACK LOCATION, UNSPECIFIED BACK PAIN LATERALITY: ICD-10-CM

## 2024-09-25 DIAGNOSIS — M85.80 OSTEOPENIA, UNSPECIFIED LOCATION: ICD-10-CM

## 2024-09-25 DIAGNOSIS — E78.00 HYPERCHOLESTEREMIA: ICD-10-CM

## 2024-09-25 DIAGNOSIS — G89.29 CHRONIC BACK PAIN, UNSPECIFIED BACK LOCATION, UNSPECIFIED BACK PAIN LATERALITY: ICD-10-CM

## 2024-09-25 DIAGNOSIS — N18.31 STAGE 3A CHRONIC KIDNEY DISEASE (MULTI): ICD-10-CM

## 2024-09-25 DIAGNOSIS — R92.8 ABNORMAL MAMMOGRAM OF LEFT BREAST: ICD-10-CM

## 2024-09-25 DIAGNOSIS — Z71.2 ENCOUNTER TO DISCUSS TEST RESULTS: Primary | ICD-10-CM

## 2024-09-25 DIAGNOSIS — R73.9 HYPERGLYCEMIA: ICD-10-CM

## 2024-09-25 DIAGNOSIS — E66.01 CLASS 3 SEVERE OBESITY DUE TO EXCESS CALORIES WITH SERIOUS COMORBIDITY AND BODY MASS INDEX (BMI) OF 45.0 TO 49.9 IN ADULT: ICD-10-CM

## 2024-09-25 DIAGNOSIS — E55.9 VITAMIN D DEFICIENCY: ICD-10-CM

## 2024-09-25 DIAGNOSIS — J44.9 CHRONIC OBSTRUCTIVE PULMONARY DISEASE, UNSPECIFIED COPD TYPE (MULTI): ICD-10-CM

## 2024-09-25 PROCEDURE — 99214 OFFICE O/P EST MOD 30 MIN: CPT | Performed by: INTERNAL MEDICINE

## 2024-09-25 PROCEDURE — 1036F TOBACCO NON-USER: CPT | Performed by: INTERNAL MEDICINE

## 2024-09-25 RX ORDER — ERGOCALCIFEROL (VITAMIN D2) 50 MCG
2000 CAPSULE ORAL DAILY
Qty: 30 CAPSULE | Refills: 2 | Status: SHIPPED | OUTPATIENT
Start: 2024-09-25 | End: 2024-12-24

## 2024-09-25 RX ORDER — SIMVASTATIN 40 MG/1
40 TABLET, FILM COATED ORAL NIGHTLY
Qty: 90 TABLET | Refills: 0 | Status: SHIPPED | OUTPATIENT
Start: 2024-09-25 | End: 2024-12-24

## 2024-09-25 RX ORDER — BUPROPION HYDROCHLORIDE 450 MG/1
450 TABLET, FILM COATED, EXTENDED RELEASE ORAL DAILY
Qty: 30 TABLET | Refills: 1 | Status: SHIPPED | OUTPATIENT
Start: 2024-09-25 | End: 2024-11-24

## 2024-09-25 NOTE — PROGRESS NOTES
Subjective   Patient ID: June Borja is a 63 y.o. female who presents for Follow-up (1 month ) and Results.  HPI    Follow up tests  labs, mammogram, dexa, CT lung cancer screening (30 pack yrs quit 2016)    Chronic back pain   Pain mgmt consult  Disability placard     Hyperglycemia on diet  HBA1C 6.2  8-24    CKD stage  3a  / GFR 45 following    Abnormal mammogram left  Recheck    CT lung cancer screen recheck 1 year    Osteopenia 2024  Recheck 2026     Shoulder pain following sleeping on it, intermittent  Otc rx     Allergic rhinitis stable on rx no side effects     CAD / hypercholesterolemia stable on therapy no side effects     GERD stable on therapy no side effects     Depression / anxiety stable on therapy no side effects doing better since she quit her job     COPD stable     Vitamin D deficiency on vit D3 5000 units daily     Diet and exercise reviewed     GYN on follow up    Review of Systems   All other systems reviewed and are negative.      Objective   /84   Pulse 85   Temp 36.3 °C (97.4 °F)   Wt 107 kg (236 lb 6.4 oz)   SpO2 99%   BMI 46.17 kg/m²   Lab Results   Component Value Date    WBC 6.8 08/12/2024    HGB 12.2 08/12/2024    HCT 39.2 08/12/2024     08/12/2024    CHOL 145 08/12/2024    TRIG 112 08/12/2024    HDL 43.7 08/12/2024    ALT 30 08/12/2024    AST 21 08/12/2024     08/12/2024    K 4.2 08/12/2024     (H) 08/12/2024    CREATININE 1.35 (H) 08/12/2024    BUN 16 08/12/2024    CO2 24 08/12/2024    TSH 3.10 08/12/2024    INR 1.1 05/18/2022    HGBA1C 6.2 (H) 08/12/2024           Physical Exam  Vitals reviewed.   Constitutional:       Appearance: Normal appearance. She is obese.   HENT:      Head: Normocephalic and atraumatic.      Mouth/Throat:      Pharynx: No posterior oropharyngeal erythema.   Eyes:      General: No scleral icterus.     Conjunctiva/sclera: Conjunctivae normal.      Pupils: Pupils are equal, round, and reactive to light.   Cardiovascular:      Rate and  Rhythm: Normal rate and regular rhythm.      Heart sounds: Normal heart sounds.   Pulmonary:      Effort: No respiratory distress.      Breath sounds: No wheezing.   Abdominal:      General: Abdomen is flat. Bowel sounds are normal. There is no distension.      Palpations: Abdomen is soft. There is no mass.      Tenderness: There is no abdominal tenderness. There is no rebound.   Musculoskeletal:         General: Normal range of motion.      Cervical back: Normal range of motion and neck supple.   Skin:     General: Skin is warm and dry.   Neurological:      General: No focal deficit present.      Mental Status: She is alert and oriented to person, place, and time. Mental status is at baseline.   Psychiatric:         Mood and Affect: Mood normal.         Behavior: Behavior normal.         Thought Content: Thought content normal.         Judgment: Judgment normal.         Problem List Items Addressed This Visit             ICD-10-CM    COPD (chronic obstructive pulmonary disease) (Multi) J44.9    Hyperglycemia R73.9    Stage 3a chronic kidney disease (Multi) N18.31    Vitamin D deficiency E55.9    Relevant Medications    ergocalciferol (Vitamin D-2) 50 MCG (2000 UT) capsule capsule     Other Visit Diagnoses         Codes    Encounter to discuss test results    -  Primary Z71.2    Abnormal mammogram of left breast     R92.8    Hypercholesteremia     E78.00    Relevant Medications    simvastatin (Zocor) 40 mg tablet    Chronic back pain, unspecified back location, unspecified back pain laterality     M54.9, G89.29    Relevant Orders    Disability Placard    Osteopenia, unspecified location     M85.80    Anxiety     F41.9    Relevant Medications    buPROPion XL (Forfivo XL) 450 mg 24 hr tablet    Class 3 severe obesity due to excess calories with serious comorbidity and body mass index (BMI) of 45.0 to 49.9 in adult (Multi)     E66.01, Z68.42          Assessment/Plan     Follow up tests  labs, mammogram, dexa, CT lung  cancer screening (30 pack yrs quit 2016)    Chronic back pain   Pain mgmt consult  Disability placard     Hyperglycemia on diet  HBA1C 6.2  8-24    CKD stage  3a  / GFR 45 following    Abnormal mammogram left  Recheck    CT lung cancer screen recheck 1 year    Osteopenia 2024  Recheck 2026     Shoulder pain following sleeping on it, intermittent  Otc rx     Allergic rhinitis stable on rx no side effects     CAD / hypercholesterolemia stable on therapy no side effects     GERD stable on therapy no side effects     Depression / anxiety stable on therapy no side effects doing better since she quit her job but still tired  Increase fortivo 450 mg daily     COPD stable     Vitamin D deficiency on vit D3 5000 units daily     Diet and exercise reviewed     GYN on follow up    Mammogram 9-24  Dexa 8-24  Cologuard 5-23 neg  CT chest lung cancer screening 9-24  recheck 9-25  GYN due  immunizations rev'd RSV, shingrix  BMI 46.1    Follow up 6 weeks

## 2024-09-30 ENCOUNTER — TELEPHONE (OUTPATIENT)
Dept: PAIN MEDICINE | Facility: HOSPITAL | Age: 64
End: 2024-09-30
Payer: MEDICAID

## 2024-09-30 DIAGNOSIS — J30.9 ALLERGIC RHINITIS, UNSPECIFIED SEASONALITY, UNSPECIFIED TRIGGER: ICD-10-CM

## 2024-09-30 RX ORDER — CETIRIZINE HYDROCHLORIDE 10 MG/1
10 TABLET ORAL DAILY PRN
Qty: 30 TABLET | Refills: 1 | Status: SHIPPED | OUTPATIENT
Start: 2024-09-30 | End: 2024-12-29

## 2024-09-30 NOTE — TELEPHONE ENCOUNTER
An attempt was made twice to contact pt at her number on file to schedule new pt appointment.  Both tries the number was not in service.  A message was left for her contact number (  sister) Elizabeth, asking her to have patient call back to schedule.     Leigh Ann Byers MA

## 2024-10-04 ENCOUNTER — OFFICE VISIT (OUTPATIENT)
Dept: PAIN MEDICINE | Facility: HOSPITAL | Age: 64
End: 2024-10-04
Payer: MEDICAID

## 2024-10-04 VITALS
RESPIRATION RATE: 16 BRPM | BODY MASS INDEX: 47.12 KG/M2 | WEIGHT: 240 LBS | HEART RATE: 96 BPM | TEMPERATURE: 98.1 F | OXYGEN SATURATION: 96 % | SYSTOLIC BLOOD PRESSURE: 132 MMHG | HEIGHT: 60 IN | DIASTOLIC BLOOD PRESSURE: 70 MMHG

## 2024-10-04 DIAGNOSIS — M79.18 MYOFASCIAL PAIN: ICD-10-CM

## 2024-10-04 DIAGNOSIS — M47.817 FACET ARTHROPATHY, LUMBOSACRAL: Primary | ICD-10-CM

## 2024-10-04 DIAGNOSIS — M47.812 FACET ARTHROPATHY, CERVICAL: ICD-10-CM

## 2024-10-04 DIAGNOSIS — Z79.899 MEDICATION MANAGEMENT: ICD-10-CM

## 2024-10-04 LAB
AMPHETAMINES UR QL SCN: NORMAL
BARBITURATES UR QL SCN: NORMAL
BZE UR QL SCN: NORMAL
CANNABINOIDS UR QL SCN: NORMAL
CREAT UR-MCNC: 29.3 MG/DL (ref 20–320)
PCP UR QL SCN: NORMAL

## 2024-10-04 PROCEDURE — 80307 DRUG TEST PRSMV CHEM ANLYZR: CPT | Performed by: NURSE PRACTITIONER

## 2024-10-04 PROCEDURE — 99214 OFFICE O/P EST MOD 30 MIN: CPT | Performed by: NURSE PRACTITIONER

## 2024-10-04 RX ORDER — GABAPENTIN 300 MG/1
CAPSULE ORAL
Qty: 180 CAPSULE | Refills: 2 | Status: SHIPPED | OUTPATIENT
Start: 2024-10-04

## 2024-10-04 RX ORDER — TIZANIDINE 2 MG/1
2 TABLET ORAL 3 TIMES DAILY PRN
Qty: 90 TABLET | Refills: 0 | Status: SHIPPED | OUTPATIENT
Start: 2024-10-04

## 2024-10-04 ASSESSMENT — ENCOUNTER SYMPTOMS
NECK PAIN: 1
EYES NEGATIVE: 1
ARTHRALGIAS: 1
PSYCHIATRIC NEGATIVE: 1
RESPIRATORY NEGATIVE: 1
BACK PAIN: 1
NECK STIFFNESS: 0
ALLERGIC/IMMUNOLOGIC NEGATIVE: 1
CARDIOVASCULAR NEGATIVE: 1
ENDOCRINE NEGATIVE: 1
MYALGIAS: 1
JOINT SWELLING: 0
CONSTITUTIONAL NEGATIVE: 1
GASTROINTESTINAL NEGATIVE: 1
NEUROLOGICAL NEGATIVE: 1

## 2024-10-04 ASSESSMENT — PAIN SCALES - GENERAL: PAINLEVEL: 8

## 2024-10-04 NOTE — PROGRESS NOTES
Last urine drug screening date/ordered today: 10/04/24        Opioid Risk Screening:   THE OPIOID RISK TOOL (ORT)                                                                      Female                     Male     1. Family History of Substance Abuse:                              Alcohol                                                   [1]=0                           [3]  =     Illicit Drugs                                             [2] = 0                         [3]   =    Prescription Drugs                                [4]= 0                          [4]   =    2. Personal History of Substance Abuse:     Alcohol                                                    [3] =  0                        [3] =     Illegal Drugs                                           [4] = 0                          [4]  =     Prescription Drugs                                [5]=   0                         [5]   =    3. Age (If between 16 to 45):               [1]=  0                         [1]   =    4. History of Preadolescent Sexual Abuse                                                                  [3]= 0                           [0]   =    5. Psychological Disease:    ADD, OCD, Bipolar, Schizophrenia    [2]= 0                           [2]   =    Depression                                          [1]=  1                           [1]   =      TOTAL Score =  1     Last opioid risk screening date/ordered today: 10/4/2024  Patient's total score is 1    Reference :  Low Score = 0 to 3  Moderate Score = 4 to 7  High Score = =8       Pain Scale Screening:   Pain Assessment and Documentation Tool (PADT)   Date of Assessment: 10/4/2024  Analgesia:   Patient reports her pain level on average during the past week is 8on a 0 - 10 scale.   Patient reports that her pain level at its worst during the past week was 10 on a 0 -10 scale.   -----------------------

## 2024-10-04 NOTE — PATIENT INSTRUCTIONS
Take Tylenol as needed for pain relief.    I restarted you on gabapentin.  Please follow dosage instructions.    ---------------    Your next appointment is with Dr. Rosa in:    [facility]    For pain block/injection on: 10/22/2024, bilateral L4-L5, L5-S1 diagnostic medial branch block #1    SEDATION: You must NOT eat or drink 6 hours before the procedure and you must have a  with you to take you home if planning to have a sedation with your block.    No aspirin this day      °You will receive a phone call the weekday before your appointment/procedure.   °Please KEEP your scheduled appointments.     °BRING your photo ID, insurance information, and a correct list of your home medications to EVERY visit.    °Please call our office at [phone] if you have any questions.     You will then be seen for a postprocedure follow-up in 2 to 3 weeks.  ---------------

## 2024-10-04 NOTE — PROGRESS NOTES
History Of Present Illness  June Borja is a 63 y.o. female who is here to establish care with pain management.  Patient is ambulatory.  Gait steady.  She arrives by herself.  Patient saw Dr. Perry on 6/1/2023 based on chart review.    Patient has chronic pain to her neck and lower back.  It has been ongoing for many years and is worsening.  Lower back is worse.  She rates her pain as 8 out of 10.  Pain is constant.  She describes it as aching, dull, sharp, shooting and stabbing kind of pain.  Back pain is aggravated with prolonged standing, walking or with certain activities.  Patient reports that she uses walker with long distance.  She also mentioned that she had issue with walking.  Patient reports that she has back pain after standing for 2 to 3 minutes.  She is able to ambulate without assistive device only for short distances.  Patient currently denies pain, numbness or tingling sensation to her legs.  She reports that when she saw Dr. Perry that she had the left sciatic pain where there was pain, numbness and tingling sensation to the back of her left leg.  This only happens on occasion.  Patient denies arm weakness, weakness in  or dropping objects.  She denies leg weakness or change in balance.  She denies bowel or bladder incontinence.  She denies recent falls, injuries, inciting events.  She denies back injury or surgery.  She denies joint replacement.    Patient has some pain to her left arm.  She reports she just had the shingles vaccine to that arm.    Patient had physical therapy in the past and is continuing her home stretching exercise.  She was taking Tylenol with no relief.  She was told she cannot take any ibuprofen.  She was taking gabapentin and stopped taking this as it was not helping.  She is using TENS unit with minimal relief.  She is doing warm and cold compress.    I reviewed previous notes and imaging.  Patient had x-ray to her cervical and lumbar spine that was done on 2/6/2023  and I reviewed the results.  I discussed the plan of care including pharmacologic and joint interventional procedure.  I discussed lumbar facet and RFA.  Pamphlets were given to her.  Patient is in agreement to proceed to the provide first series of the diagnostic medial branch block.  This is done under fluoroscopy.  She would like it with sedation due to the pain and no previous experience of interventional procedure.  I also discussed with her that if the 2 series are successful then the plan is to do the lumbar RFA.  Patient voiced understanding.  Questions were answered during this encounter.     The patient was counseled regarding diagnostic results, instructions for management, risk factor reductions, risks and benefits of treatment options and importance of compliance with treatment.        OARRS:  Subha Elena, SHERMAN-CNP, DNP on 10/4/2024 10:34 AM  I have personally reviewed the OARRS report for June Borja. I have considered the risks of abuse, dependence, addiction and diversion    Past Medical History  She has a past medical history of Kidney donor and Spondylosis without myelopathy or radiculopathy, lumbar region (06/19/2017).    Surgical History  Past Surgical History:   Procedure Laterality Date    FOOT SURGERY  03/23/2017    Foot Surgery    KIDNEY SURGERY  03/23/2017    Kidney Surgery        Social History  She reports that she has quit smoking. Her smoking use included cigarettes. She has never been exposed to tobacco smoke. She has never used smokeless tobacco. She reports that she does not drink alcohol and does not use drugs.    Family History  Family History   Problem Relation Name Age of Onset    Breast cancer Father's Sister          Allergies  Morphine    Medications    Current Outpatient Medications:     aspirin 81 mg EC tablet, Take 1 tablet (81 mg) by mouth once daily., Disp: , Rfl:     buPROPion XL (Forfivo XL) 450 mg 24 hr tablet, Take 1 tablet (450 mg) by mouth once daily. Do not crush,  chew, or split., Disp: 30 tablet, Rfl: 1    cetirizine (ZyrTEC) 10 mg tablet, Take 1 tablet (10 mg) by mouth once daily as needed for allergies., Disp: 30 tablet, Rfl: 1    ergocalciferol (Vitamin D-2) 50 MCG (2000 UT) capsule capsule, Take 1 capsule (50 mcg) by mouth once daily., Disp: 30 capsule, Rfl: 2    simvastatin (Zocor) 40 mg tablet, Take 1 tablet (40 mg) by mouth once daily at bedtime., Disp: 90 tablet, Rfl: 0    gabapentin (Neurontin) 300 mg capsule, Take 1 capsule at bedtime for 3 weeks.  Then increase to 1 capsule twice a day for another 3 weeks.  If tolerated you may take 300 mg 3 times a day after 6 weeks., Disp: 180 capsule, Rfl: 2    omeprazole OTC (PriLOSEC OTC) 20 mg EC tablet, Take 1 tablet (20 mg) by mouth once daily., Disp: 90 tablet, Rfl: 0    tiZANidine (Zanaflex) 2 mg tablet, Take 1 tablet (2 mg) by mouth 3 times a day as needed for muscle spasms., Disp: 90 tablet, Rfl: 0     Review of Systems   Constitutional: Negative.    HENT: Negative.     Eyes: Negative.    Respiratory: Negative.     Cardiovascular: Negative.    Gastrointestinal: Negative.    Endocrine: Negative.    Genitourinary: Negative.    Musculoskeletal:  Positive for arthralgias, back pain, myalgias and neck pain. Negative for gait problem, joint swelling and neck stiffness.   Skin: Negative.    Allergic/Immunologic: Negative.    Neurological: Negative.    Psychiatric/Behavioral: Negative.          Physical Exam  Vitals and nursing note reviewed.   HENT:      Head: Normocephalic.      Nose: Nose normal.   Eyes:      Extraocular Movements: Extraocular movements intact.      Conjunctiva/sclera: Conjunctivae normal.      Pupils: Pupils are equal, round, and reactive to light.   Cardiovascular:      Rate and Rhythm: Normal rate and regular rhythm.   Pulmonary:      Effort: Pulmonary effort is normal.      Breath sounds: Normal breath sounds.   Musculoskeletal:         General: Tenderness present. No swelling, deformity or signs of  injury.      Cervical back: No rigidity or tenderness.      Right lower leg: No edema.      Left lower leg: No edema.      Comments: No neck rigidity.  Full range of motion but with discomfort with lateral flexion.  Positive for Spurling test bilaterally.  Positive for paraspinal tenderness at the cervical region bilaterally at C5-C6, C6-C7.  Positive for paraspinal tenderness at the lumbar region bilaterally at L4-L5, L5-S1 with rotation.  No radicular symptoms.  Positive for bilateral SI joint pain on palpation.  Negative leg raise.  BUE 4-5/5, BLE 4-5/5.   Skin:     General: Skin is warm and dry.   Neurological:      General: No focal deficit present.      Mental Status: She is alert and oriented to person, place, and time.   Psychiatric:         Mood and Affect: Mood normal.         Behavior: Behavior normal.          Last Recorded Vitals  /70 (BP Location: Right arm, Patient Position: Sitting, BP Cuff Size: Adult)   Pulse 96   Temp 36.7 °C (98.1 °F) (Temporal)   Resp 16   Wt 109 kg (240 lb)   SpO2 96%  Body mass index is 46.87 kg/m².       Pain Management Panel           No data to display                   Relevant Results    Narrative & Impression   MRN: [id number]  Patient Name: VIN LARSON     STUDY:  Lumbar spine dated  2/6/2023.     INDICATION:  back and LLE pain  M54.50: Low back pain M54.16: Lumbar radiculopathy     COMPARISON:  Radiographs dated 03/23/2017.     ACCESSION NUMBER(S):  [id number]     ORDERING CLINICIAN:  SHELLEY JACKSON     TECHNIQUE:  AP, lateral, lateral flexion, lateral extension radiographs of the  lumbar spine.     FINDINGS:  There are  5 lumbar type vertebral bodies. There is mild  dextroconvexity of the thoracolumbar spine. Vertebral body height and  alignment  are otherwise maintained.  No fracture or dislocation is  evident.  There is moderate multilevel discogenic and facet  degenerative change the lumbar spine. Vascular calcifications are  seen over the soft tissues.      IMPRESSION:  Degenerative change of the spine without osseous injury evident   --------------       Media Information             Assessment/Plan   Problem List Items Addressed This Visit             ICD-10-CM    Facet arthropathy, lumbosacral - Primary M47.817    Relevant Medications    gabapentin (Neurontin) 300 mg capsule    Other Relevant Orders    FL pain management     Medial Nerve Branch Block    Myofascial pain M79.18    Relevant Medications    tiZANidine (Zanaflex) 2 mg tablet     Other Visit Diagnoses         Codes    Medication management     Z79.899    Relevant Orders    Opiate/Opioid/Benzo Prescription Compliance    OOB Internal Tracking                   1. Facet arthropathy, lumbosacral  - gabapentin (Neurontin) 300 mg capsule; Take 1 capsule at bedtime for 3 weeks.  Then increase to 1 capsule twice a day for another 3 weeks.  If tolerated you may take 300 mg 3 times a day after 6 weeks.  Dispense: 180 capsule; Refill: 2  - FL pain management; Future  -  Medial Nerve Branch Block; Future    2. Facet arthropathy, cervical  - gabapentin (Neurontin) 300 mg capsule; Take 1 capsule at bedtime for 3 weeks.  Then increase to 1 capsule twice a day for another 3 weeks.  If tolerated you may take 300 mg 3 times a day after 6 weeks.  Dispense: 180 capsule; Refill: 2    3. Myofascial pain  - tiZANidine (Zanaflex) 2 mg tablet; Take 1 tablet (2 mg) by mouth 3 times a day as needed for muscle spasms.  Dispense: 90 tablet; Refill: 0    4. Medication management  - Opiate/Opioid/Benzo Prescription Compliance; Future  - Opiate/Opioid/Benzo Prescription Compliance  - OOB Internal Tracking       Plan/Follow-up Instructions:   Take Tylenol as needed for pain relief.    I restarted you on gabapentin.  Please follow dosage instructions.    ---------------    Your next appointment is with Dr. Rosa in:    [facility]    For pain block/injection on: 10/22/2024, bilateral L4-L5, L5-S1 diagnostic medial branch  block #1    SEDATION: You must NOT eat or drink 6 hours before the procedure and you must have a  with you to take you home if planning to have a sedation with your block.    No aspirin this day      °You will receive a phone call the weekday before your appointment/procedure.   °Please KEEP your scheduled appointments.     °BRING your photo ID, insurance information, and a correct list of your home medications to EVERY visit.    °Please call our office at [phone] if you have any questions.     You will then be seen for a postprocedure follow-up in 2 to 3 weeks.  ---------------        Time     Prep time on date of the patient encounter: 3  Time spent directly with patient/family/caregiver: 40   Documentation time: 3  Total time on date of patient encounter: 46      Subha Elena, VIRGILIO, APRN, FNP-C    [facility]  Office #: [phone]  Fax # [phone]    ---------------------  Disclaimer: This note was created using voice recognition software. It was not corrected for typographical or grammatical errors, inadvertent word insertion, or any unintended errors. Please feel free to contact me for clarification.  -----------------

## 2024-10-10 ENCOUNTER — APPOINTMENT (OUTPATIENT)
Dept: RADIOLOGY | Facility: HOSPITAL | Age: 64
End: 2024-10-10
Payer: MEDICAID

## 2024-10-10 ENCOUNTER — HOSPITAL ENCOUNTER (EMERGENCY)
Facility: HOSPITAL | Age: 64
Discharge: HOME | End: 2024-10-10
Payer: MEDICAID

## 2024-10-10 VITALS
TEMPERATURE: 97 F | OXYGEN SATURATION: 98 % | HEART RATE: 97 BPM | SYSTOLIC BLOOD PRESSURE: 124 MMHG | HEIGHT: 60 IN | RESPIRATION RATE: 16 BRPM | BODY MASS INDEX: 45.94 KG/M2 | DIASTOLIC BLOOD PRESSURE: 70 MMHG | WEIGHT: 234 LBS

## 2024-10-10 DIAGNOSIS — L03.115 CELLULITIS OF RIGHT FOOT: Primary | ICD-10-CM

## 2024-10-10 PROCEDURE — 73630 X-RAY EXAM OF FOOT: CPT | Mod: RIGHT SIDE | Performed by: RADIOLOGY

## 2024-10-10 PROCEDURE — 2500000001 HC RX 250 WO HCPCS SELF ADMINISTERED DRUGS (ALT 637 FOR MEDICARE OP): Mod: SE | Performed by: HEALTH CARE PROVIDER

## 2024-10-10 PROCEDURE — 99283 EMERGENCY DEPT VISIT LOW MDM: CPT

## 2024-10-10 PROCEDURE — 73630 X-RAY EXAM OF FOOT: CPT | Mod: RT

## 2024-10-10 RX ORDER — CEPHALEXIN 250 MG/1
500 CAPSULE ORAL ONCE
Status: COMPLETED | OUTPATIENT
Start: 2024-10-10 | End: 2024-10-10

## 2024-10-10 RX ORDER — CEPHALEXIN 500 MG/1
500 CAPSULE ORAL 4 TIMES DAILY
Qty: 28 CAPSULE | Refills: 0 | Status: SHIPPED | OUTPATIENT
Start: 2024-10-10 | End: 2024-10-17

## 2024-10-10 RX ADMIN — CEPHALEXIN 500 MG: 250 CAPSULE ORAL at 19:21

## 2024-10-10 ASSESSMENT — PAIN DESCRIPTION - PAIN TYPE: TYPE: ACUTE PAIN

## 2024-10-10 ASSESSMENT — PAIN - FUNCTIONAL ASSESSMENT: PAIN_FUNCTIONAL_ASSESSMENT: 0-10

## 2024-10-10 ASSESSMENT — PAIN DESCRIPTION - ORIENTATION: ORIENTATION: RIGHT

## 2024-10-10 ASSESSMENT — COLUMBIA-SUICIDE SEVERITY RATING SCALE - C-SSRS
6. HAVE YOU EVER DONE ANYTHING, STARTED TO DO ANYTHING, OR PREPARED TO DO ANYTHING TO END YOUR LIFE?: NO
2. HAVE YOU ACTUALLY HAD ANY THOUGHTS OF KILLING YOURSELF?: NO
1. IN THE PAST MONTH, HAVE YOU WISHED YOU WERE DEAD OR WISHED YOU COULD GO TO SLEEP AND NOT WAKE UP?: NO

## 2024-10-10 ASSESSMENT — PAIN DESCRIPTION - LOCATION: LOCATION: FOOT

## 2024-10-10 ASSESSMENT — PAIN SCALES - GENERAL: PAINLEVEL_OUTOF10: 8

## 2024-10-10 NOTE — ED PROVIDER NOTES
HPI   Chief Complaint   Patient presents with   • Foot Injury     Dropped a can of baked beans on her foot a few days ago, increasing pain to the right foot        CC: Right foot injury  HPI:   63-year-old female with acute right foot injury.  Patient states dropping a large can of beans on her right foot    Additional Limitations to History:   External Records Reviewed: I reviewed recent and relevant outside records including   History Obtained From:     Past Medical History: Per HPI  Medications: Reviewed in EMR and with patient  Allergies:  Reviewed in EMR  Past Surgical History:   Social History:     ------------------------------------------------------------------------------------------------------  Physical Exam:  --Vital signs reviewed in nursing triage note, EMR flow sheets, and at patient's bedside  GEN:  A&Ox3, no acute distress, appears comfortable.  Conversational and appropriate.  No confusion or gross mental status changes.  EYES: EOMI, non-injected sclera.  ENT: Moist mucous membranes, no apparent injuries or lesions.   CARDIO: Normal rate and regular rhythm. No murmurs, rubs, or gallops.  2+ equal pulses of the distal extremities.   PULM: Clear to auscultation bilaterally. No rales, rhonchi, or wheezes. Good symmetric chest expansion.  GI: Soft, non-tender, non-distended. No rebound tenderness or guarding.  SKIN: Warm and dry, no rashes or lesions.  MSK: ROM intact the extremities without contractures.   EXT: No peripheral edema, contusions, or wounds.   NEURO: Cranial nerves II-XII grossly intact. Sensation to light touch intact and equal bilaterally in upper and lower extremities.  Symmetric 5/5 strength in upper and lower extremities.  PSYCH: Appropriate mood and behavior, converses and responds appropriately during exam.  -------------------------------------------------------------------------------------------------------        Differential Diagnoses Considered:   Chronic Medical Conditions  Significantly Affecting Care:   Diagnostic testing considered: [PERC, D-Dimer, PECARN, etc.]      - I independently interpreted: [CXR, CT, POCUS, etc. including your interpretation]  - Labs notable for     Escalation of Care: Appropriate for  Social Determinants of Health Significantly Affecting Care: [Homelessness, lacking transportation, uninsured, unable to afford medications]  Prescription Drug Consideration: [Antibiotics, antivirals, pain medications, etc.]  Discussion of Management with Other Providers:  I discussed the patient/results with: [admitting team, consultant, radiologist, social work, EPAT, case management, PT/OT, RT, PCP, etc.]      Alexi Stallworth PA-C              Patient History   Past Medical History:   Diagnosis Date   • Kidney donor     Kidney donor   • Spondylosis without myelopathy or radiculopathy, lumbar region 06/19/2017    Degenerative joint disease (DJD) of lumbar spine     Past Surgical History:   Procedure Laterality Date   • FOOT SURGERY  03/23/2017    Foot Surgery   • KIDNEY SURGERY  03/23/2017    Kidney Surgery     Family History   Problem Relation Name Age of Onset   • Breast cancer Father's Sister       Social History     Tobacco Use   • Smoking status: Former     Types: Cigarettes     Passive exposure: Never   • Smokeless tobacco: Never   Vaping Use   • Vaping status: Never Used   Substance Use Topics   • Alcohol use: Never   • Drug use: Never       Physical Exam   ED Triage Vitals [10/10/24 1731]   Temperature Heart Rate Respirations BP   36.1 °C (97 °F) 100 16 126/74      Pulse Ox Temp Source Heart Rate Source Patient Position   98 % Temporal Monitor Sitting      BP Location FiO2 (%)     Left arm --       Physical Exam      ED Course & MDM   Diagnoses as of 10/10/24 1856   Cellulitis of right foot                 No data recorded                                 Medical Decision Making      Procedure  Procedures     Alexi Stallworth PA-C  10/10/24 1754       Alexi Stallworth  ROGER  10/10/24 2986

## 2024-10-14 ENCOUNTER — APPOINTMENT (OUTPATIENT)
Dept: RADIOLOGY | Facility: HOSPITAL | Age: 64
End: 2024-10-14
Payer: MEDICAID

## 2024-10-22 ENCOUNTER — APPOINTMENT (OUTPATIENT)
Dept: PAIN MEDICINE | Facility: HOSPITAL | Age: 64
End: 2024-10-22
Payer: MEDICAID

## 2024-11-06 ENCOUNTER — APPOINTMENT (OUTPATIENT)
Dept: PRIMARY CARE | Facility: CLINIC | Age: 64
End: 2024-11-06
Payer: MEDICAID

## 2024-11-06 VITALS
WEIGHT: 239.8 LBS | OXYGEN SATURATION: 98 % | BODY MASS INDEX: 46.83 KG/M2 | HEART RATE: 93 BPM | TEMPERATURE: 97.6 F | DIASTOLIC BLOOD PRESSURE: 82 MMHG | SYSTOLIC BLOOD PRESSURE: 130 MMHG

## 2024-11-06 DIAGNOSIS — F41.9 ANXIETY: ICD-10-CM

## 2024-11-06 DIAGNOSIS — R73.9 HYPERGLYCEMIA: ICD-10-CM

## 2024-11-06 DIAGNOSIS — E78.00 HYPERCHOLESTEREMIA: ICD-10-CM

## 2024-11-06 DIAGNOSIS — R92.8 ABNORMAL MAMMOGRAM OF LEFT BREAST: ICD-10-CM

## 2024-11-06 DIAGNOSIS — I25.10 CORONARY ARTERY DISEASE INVOLVING NATIVE CORONARY ARTERY OF NATIVE HEART WITHOUT ANGINA PECTORIS: ICD-10-CM

## 2024-11-06 DIAGNOSIS — E66.01 CLASS 3 SEVERE OBESITY DUE TO EXCESS CALORIES WITH SERIOUS COMORBIDITY AND BODY MASS INDEX (BMI) OF 45.0 TO 49.9 IN ADULT: ICD-10-CM

## 2024-11-06 DIAGNOSIS — E66.813 CLASS 3 SEVERE OBESITY DUE TO EXCESS CALORIES WITH SERIOUS COMORBIDITY AND BODY MASS INDEX (BMI) OF 45.0 TO 49.9 IN ADULT: ICD-10-CM

## 2024-11-06 DIAGNOSIS — K21.9 GERD WITHOUT ESOPHAGITIS: ICD-10-CM

## 2024-11-06 DIAGNOSIS — E55.9 VITAMIN D DEFICIENCY: ICD-10-CM

## 2024-11-06 DIAGNOSIS — M25.571 ACUTE RIGHT ANKLE PAIN: Primary | ICD-10-CM

## 2024-11-06 DIAGNOSIS — N18.31 STAGE 3A CHRONIC KIDNEY DISEASE (MULTI): ICD-10-CM

## 2024-11-06 DIAGNOSIS — J30.9 ALLERGIC RHINITIS, UNSPECIFIED SEASONALITY, UNSPECIFIED TRIGGER: ICD-10-CM

## 2024-11-06 PROCEDURE — 99214 OFFICE O/P EST MOD 30 MIN: CPT | Performed by: INTERNAL MEDICINE

## 2024-11-06 PROCEDURE — 1036F TOBACCO NON-USER: CPT | Performed by: INTERNAL MEDICINE

## 2024-11-06 RX ORDER — CETIRIZINE HYDROCHLORIDE 10 MG/1
10 TABLET ORAL DAILY PRN
Qty: 90 TABLET | Refills: 0 | Status: SHIPPED | OUTPATIENT
Start: 2024-11-06 | End: 2025-02-04

## 2024-11-06 RX ORDER — SIMVASTATIN 40 MG/1
40 TABLET, FILM COATED ORAL NIGHTLY
Qty: 90 TABLET | Refills: 0 | Status: SHIPPED | OUTPATIENT
Start: 2024-11-06 | End: 2025-02-04

## 2024-11-06 RX ORDER — ERGOCALCIFEROL (VITAMIN D2) 50 MCG
2000 CAPSULE ORAL DAILY
Qty: 90 CAPSULE | Refills: 0 | Status: SHIPPED | OUTPATIENT
Start: 2024-11-06 | End: 2025-02-04

## 2024-11-06 RX ORDER — BUPROPION HYDROCHLORIDE 150 MG/1
150 TABLET ORAL 3 TIMES DAILY
Qty: 90 TABLET | Refills: 1 | Status: SHIPPED | OUTPATIENT
Start: 2024-11-06 | End: 2025-01-05

## 2024-11-06 RX ORDER — OMEPRAZOLE 20 MG/1
20 TABLET, DELAYED RELEASE ORAL DAILY
Qty: 90 TABLET | Refills: 0 | Status: SHIPPED | OUTPATIENT
Start: 2024-11-06 | End: 2025-02-04

## 2024-11-06 NOTE — PROGRESS NOTES
Subjective   Patient ID: June Borja is a 63 y.o. female who presents for Follow-up (6 weeks ///Was in the ER 3 weeks ago - cellulitis ///Concerned about kidney function ) and Foot Swelling (Right foot ).  HPI    Post ER follow up    Dx cellulitis resolved        Rt ankle swollen        Podiatry consult    CKD stage  3a  / GFR 45 following    Rt ankle swelling   Can of food fell on it in beginning  X rays negative  H/o surgery  Podiatry consult    Chronic back pain   Pain mgmt consult     Hyperglycemia on diet  HBA1C 6.2  8-24     Abnormal mammogram left  Recheck not done     CT lung cancer screen 9-24  recheck 1 year     Osteopenia 2024  Recheck 2026     Shoulder pain following sleeping on it, intermittent  Otc rx     Allergic rhinitis stable on rx no side effects     CAD / hypercholesterolemia stable on therapy no side effects     GERD stable on therapy no side effects     Depression / anxiety stable on therapy no side effects doing better since she quit her job but still tired  Increase fortivo 450 mg daily not picked up from pharmacy     COPD stable     Vitamin D deficiency on vit D3 5000 units daily     Diet and exercise reviewed     GYN on follow up    Review of Systems   All other systems reviewed and are negative.      Objective   /82   Pulse 93   Temp 36.4 °C (97.6 °F)   Wt 109 kg (239 lb 12.8 oz)   SpO2 98%   BMI 46.83 kg/m²   Lab Results   Component Value Date    WBC 6.8 08/12/2024    HGB 12.2 08/12/2024    HCT 39.2 08/12/2024     08/12/2024    CHOL 145 08/12/2024    TRIG 112 08/12/2024    HDL 43.7 08/12/2024    ALT 30 08/12/2024    AST 21 08/12/2024     08/12/2024    K 4.2 08/12/2024     (H) 08/12/2024    CREATININE 1.35 (H) 08/12/2024    BUN 16 08/12/2024    CO2 24 08/12/2024    TSH 3.10 08/12/2024    INR 1.1 05/18/2022    HGBA1C 6.2 (H) 08/12/2024           Physical Exam  Vitals reviewed.   Constitutional:       Appearance: Normal appearance. She is obese.   HENT:       Head: Normocephalic and atraumatic.      Mouth/Throat:      Pharynx: No posterior oropharyngeal erythema.   Eyes:      General: No scleral icterus.     Conjunctiva/sclera: Conjunctivae normal.      Pupils: Pupils are equal, round, and reactive to light.   Cardiovascular:      Rate and Rhythm: Normal rate and regular rhythm.      Heart sounds: Normal heart sounds.   Pulmonary:      Effort: No respiratory distress.      Breath sounds: No wheezing.   Abdominal:      General: Abdomen is flat. Bowel sounds are normal. There is no distension.      Palpations: Abdomen is soft. There is no mass.      Tenderness: There is no abdominal tenderness. There is no rebound.   Musculoskeletal:         General: Swelling present. Normal range of motion.      Cervical back: Normal range of motion and neck supple.      Comments: Rt ankle   Skin:     General: Skin is warm and dry.   Neurological:      General: No focal deficit present.      Mental Status: She is alert and oriented to person, place, and time. Mental status is at baseline.   Psychiatric:         Mood and Affect: Mood normal.         Behavior: Behavior normal.         Thought Content: Thought content normal.         Judgment: Judgment normal.         Problem List Items Addressed This Visit             ICD-10-CM    Allergic rhinitis J30.9    Relevant Medications    cetirizine (ZyrTEC) 10 mg tablet    Coronary artery disease I25.10    GERD without esophagitis K21.9    Relevant Medications    omeprazole OTC (PriLOSEC OTC) 20 mg EC tablet    Hyperglycemia R73.9    Stage 3a chronic kidney disease (Multi) N18.31    Relevant Orders    Basic metabolic panel    Vitamin D deficiency E55.9    Relevant Medications    ergocalciferol (Vitamin D-2) 50 MCG (2000 UT) capsule capsule     Other Visit Diagnoses         Codes    Acute right ankle pain    -  Primary M25.571    Relevant Orders    Referral to Podiatry    Abnormal mammogram of left breast     R92.8    Hypercholesteremia     E78.00     Relevant Medications    simvastatin (Zocor) 40 mg tablet    Anxiety     F41.9    Relevant Medications    buPROPion XL (Wellbutrin XL) 150 mg 24 hr tablet    Class 3 severe obesity due to excess calories with serious comorbidity and body mass index (BMI) of 45.0 to 49.9 in adult     E66.813, Z68.42, E66.01          Assessment/Plan     Post ER follow up    Dx cellulitis resolved        Rt ankle swollen        Podiatry consult    CKD stage  3a  / GFR 45 following  Recheck today    Rt ankle swelling   Can of food fell on it in beginning  X rays negative  H/o surgery  Podiatry consult    Chronic back pain   Pain mgmt consult     Hyperglycemia on diet  HBA1C 6.2  8-24     Abnormal mammogram left 9-24  Recheck not done     CT lung cancer screen 9-24  recheck 1 year     Osteopenia 2024  Recheck 2026     Shoulder pain following sleeping on it, intermittent  Otc rx     Allergic rhinitis stable on rx no side effects     CAD / hypercholesterolemia stable on therapy no side effects     GERD stable on therapy no side effects     Depression / anxiety stable on therapy no side effects doing better since she quit her job but still tired  Increase fortivo 450 mg daily not picked up from pharmacy     COPD stable     Vitamin D deficiency on vit D3 5000 units daily     Diet and exercise reviewed     GYN on follow up     Mammogram 9-24  Dexa 8-24  Cologuard 5-23 neg  CT chest lung cancer screening 9-24  recheck 9-25  GYN due  immunizations rev'd RSV, shingrix  BMI 46.8    Follow up 3 months

## 2024-11-11 ENCOUNTER — LAB (OUTPATIENT)
Dept: LAB | Facility: LAB | Age: 64
End: 2024-11-11
Payer: MEDICAID

## 2024-11-11 DIAGNOSIS — N18.31 STAGE 3A CHRONIC KIDNEY DISEASE (MULTI): ICD-10-CM

## 2024-11-11 LAB
ANION GAP SERPL CALC-SCNC: 11 MMOL/L (ref 10–20)
BUN SERPL-MCNC: 14 MG/DL (ref 6–23)
CALCIUM SERPL-MCNC: 9.4 MG/DL (ref 8.6–10.3)
CHLORIDE SERPL-SCNC: 107 MMOL/L (ref 98–107)
CO2 SERPL-SCNC: 27 MMOL/L (ref 21–32)
CREAT SERPL-MCNC: 1.17 MG/DL (ref 0.5–1.05)
EGFRCR SERPLBLD CKD-EPI 2021: 53 ML/MIN/1.73M*2
GLUCOSE SERPL-MCNC: 124 MG/DL (ref 74–99)
POTASSIUM SERPL-SCNC: 4.1 MMOL/L (ref 3.5–5.3)
SODIUM SERPL-SCNC: 141 MMOL/L (ref 136–145)

## 2024-11-11 PROCEDURE — 36415 COLL VENOUS BLD VENIPUNCTURE: CPT

## 2024-11-12 DIAGNOSIS — K21.9 GERD WITHOUT ESOPHAGITIS: ICD-10-CM

## 2024-11-12 RX ORDER — HYDROGEN PEROXIDE 3 %
20 SOLUTION, NON-ORAL MISCELLANEOUS
Qty: 90 CAPSULE | Refills: 0 | Status: SHIPPED | OUTPATIENT
Start: 2024-11-12

## 2024-11-12 RX ORDER — HYDROGEN PEROXIDE 3 %
20 SOLUTION, NON-ORAL MISCELLANEOUS
COMMUNITY
End: 2024-11-12 | Stop reason: SDUPTHER

## 2024-11-14 ENCOUNTER — TELEPHONE (OUTPATIENT)
Dept: PAIN MEDICINE | Facility: HOSPITAL | Age: 64
End: 2024-11-14
Payer: MEDICAID

## 2024-11-18 ENCOUNTER — TELEPHONE (OUTPATIENT)
Dept: PRIMARY CARE | Facility: CLINIC | Age: 64
End: 2024-11-18
Payer: MEDICAID

## 2024-11-18 ENCOUNTER — APPOINTMENT (OUTPATIENT)
Dept: RADIOLOGY | Facility: HOSPITAL | Age: 64
End: 2024-11-18
Payer: MEDICAID

## 2024-11-18 DIAGNOSIS — M54.42 ACUTE LEFT-SIDED LOW BACK PAIN WITH LEFT-SIDED SCIATICA: ICD-10-CM

## 2024-11-18 NOTE — TELEPHONE ENCOUNTER
Patient LMOM, went to pain management, ordered shots which were not covered under her insurance. Was called by office and told no reason for her to come back as there is nothing else they can do for her.    Would like referral to someone else.     Please advise

## 2024-11-22 ENCOUNTER — APPOINTMENT (OUTPATIENT)
Dept: PAIN MEDICINE | Facility: HOSPITAL | Age: 64
End: 2024-11-22
Payer: MEDICAID

## 2024-12-04 ENCOUNTER — HOSPITAL ENCOUNTER (OUTPATIENT)
Dept: RADIOLOGY | Facility: HOSPITAL | Age: 64
End: 2024-12-04
Payer: MEDICAID

## 2024-12-19 ENCOUNTER — TELEMEDICINE (OUTPATIENT)
Dept: PRIMARY CARE | Facility: CLINIC | Age: 64
End: 2024-12-19
Payer: MEDICAID

## 2024-12-19 DIAGNOSIS — M47.817 FACET ARTHROPATHY, LUMBOSACRAL: ICD-10-CM

## 2024-12-19 DIAGNOSIS — E66.01 CLASS 3 SEVERE OBESITY DUE TO EXCESS CALORIES WITH SERIOUS COMORBIDITY AND BODY MASS INDEX (BMI) OF 45.0 TO 49.9 IN ADULT: ICD-10-CM

## 2024-12-19 DIAGNOSIS — F41.9 ANXIETY: ICD-10-CM

## 2024-12-19 DIAGNOSIS — E78.00 HYPERCHOLESTEREMIA: ICD-10-CM

## 2024-12-19 DIAGNOSIS — M47.812 FACET ARTHROPATHY, CERVICAL: ICD-10-CM

## 2024-12-19 DIAGNOSIS — L03.115 CELLULITIS OF RIGHT LOWER EXTREMITY: Primary | ICD-10-CM

## 2024-12-19 DIAGNOSIS — E66.813 CLASS 3 SEVERE OBESITY DUE TO EXCESS CALORIES WITH SERIOUS COMORBIDITY AND BODY MASS INDEX (BMI) OF 45.0 TO 49.9 IN ADULT: ICD-10-CM

## 2024-12-19 DIAGNOSIS — R73.9 HYPERGLYCEMIA: ICD-10-CM

## 2024-12-19 DIAGNOSIS — M79.18 MYOFASCIAL PAIN: ICD-10-CM

## 2024-12-19 DIAGNOSIS — I25.10 CORONARY ARTERY DISEASE INVOLVING NATIVE CORONARY ARTERY OF NATIVE HEART WITHOUT ANGINA PECTORIS: ICD-10-CM

## 2024-12-19 PROCEDURE — 99214 OFFICE O/P EST MOD 30 MIN: CPT | Performed by: INTERNAL MEDICINE

## 2024-12-19 PROCEDURE — 1036F TOBACCO NON-USER: CPT | Performed by: INTERNAL MEDICINE

## 2024-12-19 RX ORDER — SIMVASTATIN 40 MG/1
40 TABLET, FILM COATED ORAL NIGHTLY
Qty: 90 TABLET | Refills: 0 | Status: SHIPPED | OUTPATIENT
Start: 2024-12-19 | End: 2025-03-19

## 2024-12-19 RX ORDER — TIZANIDINE 2 MG/1
2 TABLET ORAL DAILY PRN
Qty: 90 TABLET | Refills: 0 | Status: SHIPPED | OUTPATIENT
Start: 2024-12-19

## 2024-12-19 RX ORDER — TIZANIDINE 2 MG/1
TABLET ORAL
Qty: 90 TABLET | Refills: 0 | OUTPATIENT
Start: 2024-12-19

## 2024-12-19 RX ORDER — AMOXICILLIN AND CLAVULANATE POTASSIUM 875; 125 MG/1; MG/1
875 TABLET, FILM COATED ORAL 2 TIMES DAILY
Qty: 20 TABLET | Refills: 0 | Status: SHIPPED | OUTPATIENT
Start: 2024-12-19 | End: 2024-12-29

## 2024-12-19 NOTE — PROGRESS NOTES
Subjective   Patient ID: June Borja is a 63 y.o. female who presents for Cellulitis (On foot ).  HPI    [facility] health    Same day sick    Rt foot red no drainage, tender  No fever or streaking  cellulitis   Augmentin 875 mg bid #20 no refills  Risk / benefits rev'd   Podiatry pending Jan  Can of food fell on it in beginning  X rays negative  H/o surgery     CKD stage  3a  / GFR 45 following  Recheck today     Chronic back pain   Pain mgmt consult     Hyperglycemia on diet  HBA1C 6.2  8-24     Abnormal mammogram left 9-24  Recheck not done     CT lung cancer screen 9-24  recheck 1 year     Osteopenia 2024  Recheck 2026     Shoulder pain following sleeping on it, intermittent  Otc rx     Allergic rhinitis stable on rx no side effects     CAD / hypercholesterolemia stable on therapy no side effects     GERD stable on therapy no side effects     Depression / anxiety stable on therapy no side effects doing better since she quit her job but still tired  Increase fortivo 450 mg daily not picked up from pharmacy     COPD stable     Vitamin D deficiency on vit D3 5000 units daily     Diet and exercise reviewed     GYN on follow up    Review of Systems   All other systems reviewed and are negative.      Objective   There were no vitals taken for this visit.  Lab Results   Component Value Date    WBC 6.8 08/12/2024    HGB 12.2 08/12/2024    HCT 39.2 08/12/2024     08/12/2024    CHOL 145 08/12/2024    TRIG 112 08/12/2024    HDL 43.7 08/12/2024    ALT 30 08/12/2024    AST 21 08/12/2024     11/11/2024    K 4.1 11/11/2024     11/11/2024    CREATININE 1.17 (H) 11/11/2024    BUN 14 11/11/2024    CO2 27 11/11/2024    TSH 3.10 08/12/2024    INR 1.1 05/18/2022    HGBA1C 6.2 (H) 08/12/2024           Physical Exam  Constitutional:       Appearance: Normal appearance. She is obese.   Pulmonary:      Effort: Pulmonary effort is normal.   Skin:     Findings: Erythema present.   Neurological:      Mental Status: She is  alert.   Psychiatric:         Mood and Affect: Mood normal.         Problem List Items Addressed This Visit             ICD-10-CM    Coronary artery disease I25.10    Hyperglycemia R73.9    Facet arthropathy, lumbosacral M47.817    Myofascial pain M79.18    Relevant Medications    tiZANidine (Zanaflex) 2 mg tablet     Other Visit Diagnoses         Codes    Cellulitis of right lower extremity    -  Primary L03.115    Relevant Medications    amoxicillin-pot clavulanate (Augmentin) 875-125 mg tablet    Hypercholesteremia     E78.00    Relevant Medications    simvastatin (Zocor) 40 mg tablet    Facet arthropathy, cervical     M47.812    Anxiety     F41.9    Class 3 severe obesity due to excess calories with serious comorbidity and body mass index (BMI) of 45.0 to 49.9 in adult     E66.813, Z68.42, E66.01          Assessment/Plan     Tele health    Same day sick    Rt foot red no drainage, tender  No fever or streaking  cellulitis   Augmentin 875 mg bid #20 no refills  Risk / benefits rev'd   Podiatry pending Jan  Can of food fell on it in beginning  X rays negative  H/o surgery     CKD stage  3a  / GFR 45 following     Chronic back pain   Pain mgmt consult     Hyperglycemia on diet  HBA1C 6.2  8-24     Abnormal mammogram left 9-24  Recheck not done     CT lung cancer screen 9-24  recheck 1 year     Osteopenia 2024  Recheck 2026     Shoulder pain following sleeping on it, intermittent  Otc rx     Allergic rhinitis stable on rx no side effects     CAD / hypercholesterolemia stable on therapy no side effects     GERD stable on therapy no side effects     Depression / anxiety stable on therapy no side effects doing better since she quit her job but still tired  Increase fortivo 450 mg daily not picked up from pharmacy     COPD stable     Vitamin D deficiency on vit D3 5000 units daily     Diet and exercise reviewed     GYN on follow up    Mammogram 9-24  Dexa 8-24  Cologuard 5-23 neg  CT chest lung cancer screening 9-24   recheck 9-25  GYN due  immunizations rev'd RSV, shingrix  BMI 46.8    Follow up as scheduled

## 2025-02-06 ENCOUNTER — APPOINTMENT (OUTPATIENT)
Dept: PRIMARY CARE | Facility: CLINIC | Age: 65
End: 2025-02-06
Payer: MEDICAID